# Patient Record
Sex: FEMALE | Race: WHITE | NOT HISPANIC OR LATINO
[De-identification: names, ages, dates, MRNs, and addresses within clinical notes are randomized per-mention and may not be internally consistent; named-entity substitution may affect disease eponyms.]

---

## 2018-09-25 ENCOUNTER — APPOINTMENT (OUTPATIENT)
Dept: INTERNAL MEDICINE | Facility: CLINIC | Age: 73
End: 2018-09-25
Payer: MEDICARE

## 2018-09-25 VITALS
TEMPERATURE: 97.6 F | OXYGEN SATURATION: 99 % | HEIGHT: 62.5 IN | SYSTOLIC BLOOD PRESSURE: 180 MMHG | HEART RATE: 94 BPM | DIASTOLIC BLOOD PRESSURE: 96 MMHG | BODY MASS INDEX: 21.17 KG/M2 | WEIGHT: 118 LBS

## 2018-09-25 DIAGNOSIS — Z82.3 FAMILY HISTORY OF STROKE: ICD-10-CM

## 2018-09-25 PROCEDURE — G0008: CPT

## 2018-09-25 PROCEDURE — 99214 OFFICE O/P EST MOD 30 MIN: CPT | Mod: 25

## 2018-09-25 PROCEDURE — 90662 IIV NO PRSV INCREASED AG IM: CPT

## 2018-09-25 NOTE — ASSESSMENT
[FreeTextEntry1] : Patient with chronic anxiety worse of late. I feel this patient would benefit from a trial of Lexapro 5 mg daily. Influenza vaccine 0.5 cc IM has been given. Patient is to call me in one month.

## 2018-09-25 NOTE — HISTORY OF PRESENT ILLNESS
[FreeTextEntry1] : Patient complains of increased anxiety. [de-identified] : Patient states that for the last few months she's been increasingly anxious regarding the health of her family. She's been having increasing difficulty with irritable bowel syndrome with frequent abdominal gassy pain. She sees an oncologist. She has a history of colon cancer 18 years ago. She has occasional loose bowels. She had a mammogram about one year ago. She feels she needs something for chronic anxiety.

## 2019-02-21 ENCOUNTER — MEDICATION RENEWAL (OUTPATIENT)
Age: 74
End: 2019-02-21

## 2019-03-05 ENCOUNTER — APPOINTMENT (OUTPATIENT)
Dept: INTERNAL MEDICINE | Facility: CLINIC | Age: 74
End: 2019-03-05
Payer: MEDICARE

## 2019-03-05 VITALS
HEART RATE: 89 BPM | SYSTOLIC BLOOD PRESSURE: 150 MMHG | TEMPERATURE: 97.6 F | OXYGEN SATURATION: 99 % | HEIGHT: 62.5 IN | WEIGHT: 106 LBS | DIASTOLIC BLOOD PRESSURE: 80 MMHG | BODY MASS INDEX: 19.02 KG/M2

## 2019-03-05 PROCEDURE — 99213 OFFICE O/P EST LOW 20 MIN: CPT

## 2019-03-05 NOTE — ASSESSMENT
[FreeTextEntry1] : Patient with a history of small bowel obstruction in October due to adhesions. She is clinically stable although underweight. I've advised the patient to liberalize her diet and to try to gain 10 pounds. She will see me again in 6 months.

## 2019-09-27 ENCOUNTER — APPOINTMENT (OUTPATIENT)
Dept: INTERNAL MEDICINE | Facility: CLINIC | Age: 74
End: 2019-09-27
Payer: MEDICARE

## 2019-09-27 VITALS
DIASTOLIC BLOOD PRESSURE: 80 MMHG | BODY MASS INDEX: 17.3 KG/M2 | OXYGEN SATURATION: 98 % | SYSTOLIC BLOOD PRESSURE: 140 MMHG | WEIGHT: 94 LBS | HEIGHT: 62 IN | HEART RATE: 84 BPM

## 2019-09-27 PROCEDURE — 99214 OFFICE O/P EST MOD 30 MIN: CPT | Mod: 25

## 2019-09-27 PROCEDURE — G0008: CPT

## 2019-09-27 PROCEDURE — 90653 IIV ADJUVANT VACCINE IM: CPT

## 2019-09-27 NOTE — HISTORY OF PRESENT ILLNESS
[FreeTextEntry1] : Followup underweight status [de-identified] : Patient has lost a few pounds since I last saw her 6 months ago. She's afraid T. certain foods because of her history of small bowel obstruction in October. She currently weighs 94 pounds. She feels generally well. She has occasional gassy abdominal discomfort and frequent bowels. She is on a low fiber diet. She again is afraid he because of the possibility of bowel obstruction. She has seen her oncologist in July.

## 2019-09-27 NOTE — PHYSICAL EXAM
[No Acute Distress] : no acute distress [Well Nourished] : well nourished [Well Developed] : well developed [Well-Appearing] : well-appearing [Normal Sclera/Conjunctiva] : normal sclera/conjunctiva [PERRL] : pupils equal round and reactive to light [EOMI] : extraocular movements intact [Normal Outer Ear/Nose] : the outer ears and nose were normal in appearance [Normal Oropharynx] : the oropharynx was normal [No JVD] : no jugular venous distention [No Lymphadenopathy] : no lymphadenopathy [Thyroid Normal, No Nodules] : the thyroid was normal and there were no nodules present [Supple] : supple [No Respiratory Distress] : no respiratory distress  [No Accessory Muscle Use] : no accessory muscle use [Clear to Auscultation] : lungs were clear to auscultation bilaterally [Normal Rate] : normal rate  [Normal S1, S2] : normal S1 and S2 [Regular Rhythm] : with a regular rhythm [No Murmur] : no murmur heard [No Carotid Bruits] : no carotid bruits [No Abdominal Bruit] : a ~M bruit was not heard ~T in the abdomen [No Varicosities] : no varicosities [Pedal Pulses Present] : the pedal pulses are present [No Edema] : there was no peripheral edema [No Palpable Aorta] : no palpable aorta [No Extremity Clubbing/Cyanosis] : no extremity clubbing/cyanosis [Soft] : abdomen soft [Non Tender] : non-tender [Non-distended] : non-distended [No Masses] : no abdominal mass palpated [No HSM] : no HSM [Normal Bowel Sounds] : normal bowel sounds [Normal Posterior Cervical Nodes] : no posterior cervical lymphadenopathy [Normal Anterior Cervical Nodes] : no anterior cervical lymphadenopathy [No CVA Tenderness] : no CVA  tenderness [No Spinal Tenderness] : no spinal tenderness [Grossly Normal Strength/Tone] : grossly normal strength/tone [No Joint Swelling] : no joint swelling [No Rash] : no rash [Coordination Grossly Intact] : coordination grossly intact [No Focal Deficits] : no focal deficits [Normal Gait] : normal gait [Normal Affect] : the affect was normal [Normal Insight/Judgement] : insight and judgment were intact

## 2019-09-27 NOTE — ASSESSMENT
[FreeTextEntry1] : Patient is underweight. Have had a long discussion with the patient that she should eat whatever she wants and gaining weight. I would refer to weight management discussed meal plan. High dose of flu vaccine has been given.

## 2019-10-28 ENCOUNTER — MEDICATION RENEWAL (OUTPATIENT)
Age: 74
End: 2019-10-28

## 2020-01-17 ENCOUNTER — APPOINTMENT (OUTPATIENT)
Dept: INTERNAL MEDICINE | Facility: CLINIC | Age: 75
End: 2020-01-17
Payer: MEDICARE

## 2020-01-17 ENCOUNTER — LABORATORY RESULT (OUTPATIENT)
Age: 75
End: 2020-01-17

## 2020-01-17 VITALS
HEART RATE: 90 BPM | DIASTOLIC BLOOD PRESSURE: 72 MMHG | SYSTOLIC BLOOD PRESSURE: 168 MMHG | HEIGHT: 62 IN | WEIGHT: 89 LBS | BODY MASS INDEX: 16.38 KG/M2

## 2020-01-17 PROCEDURE — 99214 OFFICE O/P EST MOD 30 MIN: CPT | Mod: 25

## 2020-01-17 PROCEDURE — 36415 COLL VENOUS BLD VENIPUNCTURE: CPT

## 2020-01-17 NOTE — ASSESSMENT
[FreeTextEntry1] : Patient clearly underweight. She currently weighs 90 pounds. She is generally somewhat weak with trouble walking long distances due to lower extremity weakness. I feel this patient should see a nutritionist regarding a diet to help her gain weight. I think she would also benefit from physical therapy for ambulation training. Patient is to call me in one month. Will also draw labs as recommended by dermatology.

## 2020-01-17 NOTE — PHYSICAL EXAM
[No Acute Distress] : no acute distress [Well Nourished] : well nourished [Well-Appearing] : well-appearing [Normal Sclera/Conjunctiva] : normal sclera/conjunctiva [PERRL] : pupils equal round and reactive to light [EOMI] : extraocular movements intact [Normal Outer Ear/Nose] : the outer ears and nose were normal in appearance [Normal Oropharynx] : the oropharynx was normal [No JVD] : no jugular venous distention [No Lymphadenopathy] : no lymphadenopathy [Supple] : supple [No Respiratory Distress] : no respiratory distress  [Thyroid Normal, No Nodules] : the thyroid was normal and there were no nodules present [No Accessory Muscle Use] : no accessory muscle use [Clear to Auscultation] : lungs were clear to auscultation bilaterally [Normal Rate] : normal rate  [Regular Rhythm] : with a regular rhythm [Normal S1, S2] : normal S1 and S2 [No Murmur] : no murmur heard [No Carotid Bruits] : no carotid bruits [No Abdominal Bruit] : a ~M bruit was not heard ~T in the abdomen [Pedal Pulses Present] : the pedal pulses are present [No Varicosities] : no varicosities [No Edema] : there was no peripheral edema [No Palpable Aorta] : no palpable aorta [No Extremity Clubbing/Cyanosis] : no extremity clubbing/cyanosis [Non Tender] : non-tender [Soft] : abdomen soft [Non-distended] : non-distended [No Masses] : no abdominal mass palpated [No HSM] : no HSM [Normal Bowel Sounds] : normal bowel sounds [Normal Anterior Cervical Nodes] : no anterior cervical lymphadenopathy [Normal Posterior Cervical Nodes] : no posterior cervical lymphadenopathy [No CVA Tenderness] : no CVA  tenderness [No Spinal Tenderness] : no spinal tenderness [Grossly Normal Strength/Tone] : grossly normal strength/tone [No Joint Swelling] : no joint swelling [No Rash] : no rash [No Focal Deficits] : no focal deficits [Coordination Grossly Intact] : coordination grossly intact [Normal Gait] : normal gait [Normal Insight/Judgement] : insight and judgment were intact [Normal Affect] : the affect was normal [de-identified] : underweight

## 2020-01-17 NOTE — HISTORY OF PRESENT ILLNESS
[FreeTextEntry1] : Evaluation for underweight status. [de-identified] : Patient was hospitalized for 3 days in the beginning of December with a small bowel obstruction. It resolves spontaneously. Patient is currently on a low fiber diet but is afraid to eat a lot. Her weight is down to 90 pounds. She feels weak with trouble walking. Her balance she states it is a bit off. She feels she has lower extremity weakness. Her upper extremities are fine. She is again afraid to use a lot of fluid because she feels she will develop a bowel obstruction. She recently saw the dermatologist because of a rash. She was told to have blood work for Sjogren's and lupus. She does have dry eyes. She has a slight dry mouth. She denies joint pains.

## 2020-01-17 NOTE — REVIEW OF SYSTEMS
[Recent Change In Weight] : ~T recent weight change [Negative] : Heme/Lymph [FreeTextEntry2] : lost 5 lbs.

## 2020-01-22 LAB
ALBUMIN SERPL ELPH-MCNC: 4.5 G/DL
ALP BLD-CCNC: 80 U/L
ALT SERPL-CCNC: 21 U/L
ANION GAP SERPL CALC-SCNC: 13 MMOL/L
AST SERPL-CCNC: 24 U/L
BASOPHILS # BLD AUTO: 0 K/UL
BASOPHILS NFR BLD AUTO: 0 %
BILIRUB SERPL-MCNC: 0.4 MG/DL
BUN SERPL-MCNC: 13 MG/DL
CALCIUM SERPL-MCNC: 9 MG/DL
CHLORIDE SERPL-SCNC: 89 MMOL/L
CO2 SERPL-SCNC: 22 MMOL/L
CREAT SERPL-MCNC: 0.54 MG/DL
ENA SS-A AB SER IA-ACNC: 0.2 AL
ENA SS-B AB SER IA-ACNC: <0.2 AL
EOSINOPHIL # BLD AUTO: 0 K/UL
EOSINOPHIL NFR BLD AUTO: 0 %
ERYTHROCYTE [SEDIMENTATION RATE] IN BLOOD BY WESTERGREN METHOD: 33 MM/HR
GLUCOSE SERPL-MCNC: 92 MG/DL
HCT VFR BLD CALC: 31.9 %
HGB BLD-MCNC: 10.9 G/DL
LYMPHOCYTES # BLD AUTO: 1.02 K/UL
LYMPHOCYTES NFR BLD AUTO: 37.6 %
MAN DIFF?: NORMAL
MCHC RBC-ENTMCNC: 32.2 PG
MCHC RBC-ENTMCNC: 34.2 GM/DL
MCV RBC AUTO: 94.4 FL
MONOCYTES # BLD AUTO: 0.3 K/UL
MONOCYTES NFR BLD AUTO: 11 %
NEUTROPHILS # BLD AUTO: 1.32 K/UL
NEUTROPHILS NFR BLD AUTO: 48.6 %
PLATELET # BLD AUTO: 268 K/UL
POTASSIUM SERPL-SCNC: 5.1 MMOL/L
PROT SERPL-MCNC: 6.7 G/DL
RBC # BLD: 3.38 M/UL
RBC # FLD: 12.1 %
SODIUM SERPL-SCNC: 124 MMOL/L
WBC # FLD AUTO: 2.72 K/UL

## 2020-01-23 ENCOUNTER — APPOINTMENT (OUTPATIENT)
Dept: BARIATRICS | Facility: CLINIC | Age: 75
End: 2020-01-23
Payer: MEDICARE

## 2020-01-23 VITALS
WEIGHT: 90 LBS | BODY MASS INDEX: 16.46 KG/M2 | DIASTOLIC BLOOD PRESSURE: 82 MMHG | HEART RATE: 88 BPM | SYSTOLIC BLOOD PRESSURE: 167 MMHG

## 2020-01-23 PROCEDURE — 99204 OFFICE O/P NEW MOD 45 MIN: CPT

## 2020-01-23 RX ORDER — PHENOBARBITAL, HYOSCYAMINE SULFATE, ATROPINE SULFATE, SCOPOLAMINE HYDROBROMIDE 16.2; .1037; .0194; .0065 MG/1; MG/1; MG/1; MG/1
16.2 TABLET ORAL
Refills: 0 | Status: DISCONTINUED | COMMUNITY
End: 2020-01-23

## 2020-01-23 NOTE — ASSESSMENT
[FreeTextEntry1] : Weight loss likely secondary to malabsorption and recent dietary restrictions\par Discussed ways to increase carbs/protein/fat sources when following low fiber/residue diet\par Will add iced cream shakes, peanut butter as tolerated\par Will refer to RD\par Screen for nutritional deficiency and hormonal conditions that may be contributing such as hyperthyroidism and adrenal insufficiency both unlikely \par Patient to start PT strengthening program\par RTO PRN

## 2020-01-23 NOTE — HISTORY OF PRESENT ILLNESS
[FreeTextEntry1] : 74 year old female for consultation due to unintentional weight loss.\par Patient is accompanied by her .  \par Patient had colon resection for colon cancer 18 years ago.  19 inches removed.  Two hospitalizations for SBO's- conservative management no requiring surgery but has lost about 5o pounds overall- 10 pounds in the past year.  Currently on a low fiber/residue diet since her last hospitalization in 2019.  \par Reports diarrhea if she has too much ensure- not sure if she is lactose intolerant.  \par History of constipation- now controlled with Senna\par +weakness, LE mostly planning to start PT in 2 weeks\par Food recall- B 2 eggs, 2 white bread toast with butter and jam S- 2 pieces of toast L- tomato soup puree (cream based) 1/2 sandwich D soup, chicken with butter S- iced cream and cookies \par

## 2020-01-23 NOTE — REASON FOR VISIT
[Initial Consultation] : an initial consultation for [FreeTextEntry2] : Weight loss, failure to thrive

## 2020-01-23 NOTE — CONSULT LETTER
[Consult Letter:] : I had the pleasure of evaluating your patient, [unfilled]. [Dear  ___] : Dear  [unfilled], [( Thank you for referring [unfilled] for consultation for _____ )] : Thank you for referring [unfilled] for consultation for [unfilled] [Please see my note below.] : Please see my note below. [Consult Closing:] : Thank you very much for allowing me to participate in the care of this patient.  If you have any questions, please do not hesitate to contact me. [Sincerely,] : Sincerely, [FreeTextEntry3] : Irene Menchaca

## 2020-01-23 NOTE — PHYSICAL EXAM
[Thin] : thin [Normal] : affect appropriate [de-identified] : small incisional hernia  [de-identified] : +rash b/l hands end of fingertips

## 2020-01-24 LAB
ANA PAT FLD IF-IMP: ABNORMAL
ANA SER IF-ACNC: ABNORMAL

## 2020-01-30 ENCOUNTER — APPOINTMENT (OUTPATIENT)
Dept: INTERNAL MEDICINE | Facility: CLINIC | Age: 75
End: 2020-01-30
Payer: MEDICARE

## 2020-01-30 PROCEDURE — 36415 COLL VENOUS BLD VENIPUNCTURE: CPT

## 2020-01-31 LAB
25(OH)D3 SERPL-MCNC: 28.5 NG/ML
ALBUMIN SERPL ELPH-MCNC: 4.3 G/DL
ALP BLD-CCNC: 84 U/L
ALT SERPL-CCNC: 23 U/L
ANION GAP SERPL CALC-SCNC: 10 MMOL/L
AST SERPL-CCNC: 23 U/L
BASOPHILS # BLD AUTO: 0.02 K/UL
BASOPHILS NFR BLD AUTO: 0.7 %
BILIRUB SERPL-MCNC: 0.3 MG/DL
BUN SERPL-MCNC: 20 MG/DL
CALCIUM SERPL-MCNC: 9.5 MG/DL
CHLORIDE SERPL-SCNC: 94 MMOL/L
CK SERPL-CCNC: 86 U/L
CO2 SERPL-SCNC: 25 MMOL/L
CORTIS SERPL-MCNC: 12.1 UG/DL
CREAT SERPL-MCNC: 0.57 MG/DL
EOSINOPHIL # BLD AUTO: 0.06 K/UL
EOSINOPHIL NFR BLD AUTO: 2.1 %
ERYTHROCYTE [SEDIMENTATION RATE] IN BLOOD BY WESTERGREN METHOD: 28 MM/HR
ESTIMATED AVERAGE GLUCOSE: 114 MG/DL
FERRITIN SERPL-MCNC: 130 NG/ML
FOLATE SERPL-MCNC: 17 NG/ML
GLUCOSE SERPL-MCNC: 94 MG/DL
HBA1C MFR BLD HPLC: 5.6 %
HCT VFR BLD CALC: 35.9 %
HGB BLD-MCNC: 11.8 G/DL
IMM GRANULOCYTES NFR BLD AUTO: 0.3 %
IRON SATN MFR SERPL: 29 %
IRON SERPL-MCNC: 88 UG/DL
LYMPHOCYTES # BLD AUTO: 0.76 K/UL
LYMPHOCYTES NFR BLD AUTO: 26.5 %
MAN DIFF?: NORMAL
MCHC RBC-ENTMCNC: 32.3 PG
MCHC RBC-ENTMCNC: 32.9 GM/DL
MCV RBC AUTO: 98.4 FL
MONOCYTES # BLD AUTO: 0.34 K/UL
MONOCYTES NFR BLD AUTO: 11.8 %
NEUTROPHILS # BLD AUTO: 1.68 K/UL
NEUTROPHILS NFR BLD AUTO: 58.6 %
PLATELET # BLD AUTO: 292 K/UL
POTASSIUM SERPL-SCNC: 4.7 MMOL/L
PROT SERPL-MCNC: 7 G/DL
RBC # BLD: 3.65 M/UL
RBC # FLD: 12.6 %
SODIUM SERPL-SCNC: 129 MMOL/L
TIBC SERPL-MCNC: 300 UG/DL
TSH SERPL-ACNC: 1.22 UIU/ML
UIBC SERPL-MCNC: 212 UG/DL
VIT B12 SERPL-MCNC: 326 PG/ML
WBC # FLD AUTO: 2.87 K/UL

## 2020-02-04 LAB — ALDOLASE SERPL-CCNC: 3.4 U/L

## 2020-02-05 LAB
VIT B1 SERPL-MCNC: 101.5 NMOL/L
VIT B6 SERPL-MCNC: 17.6 UG/L

## 2020-02-20 ENCOUNTER — APPOINTMENT (OUTPATIENT)
Dept: RHEUMATOLOGY | Facility: CLINIC | Age: 75
End: 2020-02-20
Payer: MEDICARE

## 2020-02-20 VITALS
SYSTOLIC BLOOD PRESSURE: 152 MMHG | BODY MASS INDEX: 16.56 KG/M2 | HEIGHT: 62 IN | WEIGHT: 90 LBS | DIASTOLIC BLOOD PRESSURE: 78 MMHG

## 2020-02-20 PROCEDURE — 99205 OFFICE O/P NEW HI 60 MIN: CPT

## 2020-02-20 RX ORDER — OLMESARTAN MEDOXOMIL 40 MG/1
40 TABLET, FILM COATED ORAL
Qty: 90 | Refills: 3 | Status: DISCONTINUED | COMMUNITY
Start: 2019-10-28 | End: 2020-02-20

## 2020-02-20 RX ORDER — OLMESARTAN MEDOXOMIL 40 MG/1
40 TABLET, FILM COATED ORAL DAILY
Qty: 90 | Refills: 3 | Status: DISCONTINUED | COMMUNITY
End: 2020-02-20

## 2020-02-20 RX ORDER — AMLODIPINE BESYLATE 2.5 MG/1
2.5 TABLET ORAL DAILY
Qty: 90 | Refills: 3 | Status: DISCONTINUED | COMMUNITY
End: 2020-02-20

## 2020-02-20 RX ORDER — OLMESARTAN MEDOXOMIL 40 MG/1
40 TABLET, FILM COATED ORAL
Qty: 90 | Refills: 3 | Status: DISCONTINUED | COMMUNITY
Start: 2020-01-17 | End: 2020-02-20

## 2020-02-20 RX ORDER — AMLODIPINE BESYLATE 2.5 MG/1
2.5 TABLET ORAL DAILY
Qty: 90 | Refills: 3 | Status: DISCONTINUED | COMMUNITY
Start: 2019-10-28 | End: 2020-02-20

## 2020-02-21 NOTE — REASON FOR VISIT
[Spouse] : spouse [Initial Evaluation] : an initial evaluation [FreeTextEntry1] : high BEN; rash; weight loss

## 2020-02-21 NOTE — PHYSICAL EXAM
[General Appearance - Alert] : alert [General Appearance - In No Acute Distress] : in no acute distress [General Appearance - Well Developed] : well developed [Sclera] : the sclera and conjunctiva were normal [Auscultation Breath Sounds / Voice Sounds] : lungs were clear to auscultation bilaterally [] : no respiratory distress [Edema] : there was no peripheral edema [Cervical Lymph Nodes Enlarged Posterior Bilaterally] : posterior cervical [Cervical Lymph Nodes Enlarged Anterior Bilaterally] : anterior cervical [Motor Exam] : the motor exam was normal [FreeTextEntry1] : Motor strength is 5/5 proximally. There is no objective weakness of neck extensors and flexors.

## 2020-02-21 NOTE — REVIEW OF SYSTEMS
[Constipation] : constipation [Skin Lesions] : skin lesion [Eye Pain] : no eye pain [Fever] : no fever [Chills] : no chills [Cough] : no cough [Shortness Of Breath] : no shortness of breath [Red Eyes] : eyes not red [Abdominal Pain] : no abdominal pain [SOB on Exertion] : no shortness of breath during exertion [Dysuria] : no dysuria [Diarrhea] : no diarrhea [Joint Swelling] : no joint swelling [Joint Pain] : no joint pain [Joint Stiffness] : no joint stiffness

## 2020-02-21 NOTE — HISTORY OF PRESENT ILLNESS
[FreeTextEntry1] : Patient is referred because of weight loss of about 40 lbs over about one year (no longer losing weight, but having difficulty gaining weight), and development of a rash on her hands about two months ago. Saw dermatology and was told she might have dermatomyositis and BEN was found to be high, with normal CK, and was sent for evaluation. There is no proximal weakness, nor neck flexor or extensor weakness. There is no other skin rash or skin photosensitivity, Raynaud's, fever, serositis, thrombotic events, hair thinning or other associated symptoms. There is no BECERRA or other pulmonary or cardiac symptoms. Patient has been hospitalized twice within the last year and a half with small bowel obstruction secondary to presumed surgical adhesions (s/p large bowel resection due to Ca).

## 2020-02-24 LAB
CENTROMERE IGG SER-ACNC: <0.2 CD:130001892
CK SERPL-CCNC: 119 U/L
CRP SERPL-MCNC: <0.1 MG/DL
DSDNA AB SER-ACNC: 32 IU/ML
ENA RNP AB SER IA-ACNC: 1.4 AL
ENA SCL70 IGG SER IA-ACNC: <0.2 AL
ENA SM AB SER IA-ACNC: 0.4 AL
ENA SS-A AB SER IA-ACNC: 0.2 AL
ENA SS-B AB SER IA-ACNC: <0.2 AL
ERYTHROCYTE [SEDIMENTATION RATE] IN BLOOD BY WESTERGREN METHOD: 33 MM/HR

## 2020-02-25 LAB
ALDOLASE SERPL-CCNC: 3.3 U/L
HISTONE AB SER QL: 0.5 UNITS

## 2020-02-26 LAB
ANA PAT FLD IF-IMP: ABNORMAL
ANA PATTERN: ABNORMAL
ANA SER IF-ACNC: ABNORMAL
ANA TITER: ABNORMAL

## 2020-03-11 LAB
EJ AB SER QL: NEGATIVE
ENA JO1 AB SER IA-ACNC: <20 UNITS
ENA PM/SCL AB SER-ACNC: <20 UNITS
ENA SM+RNP AB SER IA-ACNC: 69 UNITS
ENA SS-A IGG SER QL: <20 UNITS
FIBRILLARIN AB SER QL: NEGATIVE
KU AB SER QL: NEGATIVE
MDA-5 (P140)(CADM-140): <20 UNITS
MI2 AB SER QL: NEGATIVE
NXP-2 (P140): <20 UNITS
OJ AB SER QL: NEGATIVE
PL12 AB SER QL: NEGATIVE
PL7 AB SER QL: NEGATIVE
SRP AB SERPL QL: NEGATIVE
TIF GAMMA (P155/140): <20 UNITS
U2 SNRNP AB SER QL: NEGATIVE

## 2020-03-16 LAB — RNA POLYMERASE III IGG: 3 UNITS

## 2020-03-19 ENCOUNTER — APPOINTMENT (OUTPATIENT)
Dept: RHEUMATOLOGY | Facility: CLINIC | Age: 75
End: 2020-03-19

## 2020-04-10 ENCOUNTER — APPOINTMENT (OUTPATIENT)
Dept: RHEUMATOLOGY | Facility: CLINIC | Age: 75
End: 2020-04-10
Payer: MEDICARE

## 2020-04-10 PROCEDURE — 99443: CPT

## 2020-04-17 ENCOUNTER — APPOINTMENT (OUTPATIENT)
Dept: INTERNAL MEDICINE | Facility: CLINIC | Age: 75
End: 2020-04-17
Payer: MEDICARE

## 2020-04-17 PROCEDURE — G2012 BRIEF CHECK IN BY MD/QHP: CPT

## 2020-05-19 ENCOUNTER — LABORATORY RESULT (OUTPATIENT)
Age: 75
End: 2020-05-19

## 2020-05-19 ENCOUNTER — APPOINTMENT (OUTPATIENT)
Dept: INTERNAL MEDICINE | Facility: CLINIC | Age: 75
End: 2020-05-19
Payer: MEDICARE

## 2020-05-19 VITALS
HEIGHT: 62 IN | HEART RATE: 94 BPM | DIASTOLIC BLOOD PRESSURE: 70 MMHG | BODY MASS INDEX: 16.93 KG/M2 | SYSTOLIC BLOOD PRESSURE: 130 MMHG | WEIGHT: 92 LBS | OXYGEN SATURATION: 99 %

## 2020-05-19 PROCEDURE — 99214 OFFICE O/P EST MOD 30 MIN: CPT | Mod: 25

## 2020-05-19 PROCEDURE — 36415 COLL VENOUS BLD VENIPUNCTURE: CPT

## 2020-05-19 NOTE — PLAN
[FreeTextEntry1] : 4 CT of the abdomen and pelvis with and without contrast. We'll obtain routine labs. Patient is to call on Thursday for results in

## 2020-05-19 NOTE — ASSESSMENT
[FreeTextEntry1] : I am concerned that the patient has not gained more weight as I expected. We'll check routine labs. Will order a CT of the abdomen and pelvis with and without contrast for evaluation of weight loss. If there is nothing on the CAT scan of significance we'll refer her to GI.

## 2020-05-19 NOTE — PHYSICAL EXAM
[No Acute Distress] : no acute distress [Well Nourished] : well nourished [Well-Appearing] : well-appearing [EOMI] : extraocular movements intact [PERRL] : pupils equal round and reactive to light [Normal Sclera/Conjunctiva] : normal sclera/conjunctiva [Normal Oropharynx] : the oropharynx was normal [Normal Outer Ear/Nose] : the outer ears and nose were normal in appearance [Supple] : supple [No JVD] : no jugular venous distention [No Lymphadenopathy] : no lymphadenopathy [Thyroid Normal, No Nodules] : the thyroid was normal and there were no nodules present [No Respiratory Distress] : no respiratory distress  [Clear to Auscultation] : lungs were clear to auscultation bilaterally [Normal Rate] : normal rate  [No Accessory Muscle Use] : no accessory muscle use [No Murmur] : no murmur heard [Regular Rhythm] : with a regular rhythm [Normal S1, S2] : normal S1 and S2 [No Abdominal Bruit] : a ~M bruit was not heard ~T in the abdomen [No Carotid Bruits] : no carotid bruits [No Varicosities] : no varicosities [No Edema] : there was no peripheral edema [Pedal Pulses Present] : the pedal pulses are present [No Palpable Aorta] : no palpable aorta [Soft] : abdomen soft [No Extremity Clubbing/Cyanosis] : no extremity clubbing/cyanosis [Non-distended] : non-distended [Non Tender] : non-tender [No Masses] : no abdominal mass palpated [No HSM] : no HSM [Normal Bowel Sounds] : normal bowel sounds [Normal Anterior Cervical Nodes] : no anterior cervical lymphadenopathy [Normal Posterior Cervical Nodes] : no posterior cervical lymphadenopathy [No Spinal Tenderness] : no spinal tenderness [No Joint Swelling] : no joint swelling [No CVA Tenderness] : no CVA  tenderness [Grossly Normal Strength/Tone] : grossly normal strength/tone [Coordination Grossly Intact] : coordination grossly intact [No Rash] : no rash [Normal Gait] : normal gait [No Focal Deficits] : no focal deficits [Normal Insight/Judgement] : insight and judgment were intact [Normal Affect] : the affect was normal [de-identified] : underweight

## 2020-05-19 NOTE — HISTORY OF PRESENT ILLNESS
[FreeTextEntry1] : Followup underweight status. [de-identified] : Patient has gained only 2 pounds since I last saw her in January. She remained so for a LASHON concern with fluids because of the fear that she is now going to develop a bowel obstruction. She has occasional upper gastric distress. She states that she always feels hungry. She eats mainly chicken and fish. She denies nausea, vomiting. She is chronically constipated but better with the use of Senokot. Her weight is currently 92 pounds. She goes for physical therapy to improve her overall strength and ability to walk.

## 2020-05-21 LAB
ALBUMIN SERPL ELPH-MCNC: 4.5 G/DL
ALP BLD-CCNC: 87 U/L
ALT SERPL-CCNC: 32 U/L
ANION GAP SERPL CALC-SCNC: 12 MMOL/L
AST SERPL-CCNC: 29 U/L
BASOPHILS # BLD AUTO: 0 K/UL
BASOPHILS NFR BLD AUTO: 0 %
BILIRUB SERPL-MCNC: 0.3 MG/DL
BUN SERPL-MCNC: 23 MG/DL
CALCIUM SERPL-MCNC: 9.6 MG/DL
CHLORIDE SERPL-SCNC: 91 MMOL/L
CO2 SERPL-SCNC: 24 MMOL/L
CREAT SERPL-MCNC: 0.53 MG/DL
EOSINOPHIL # BLD AUTO: 0 K/UL
EOSINOPHIL NFR BLD AUTO: 0 %
GLUCOSE SERPL-MCNC: 93 MG/DL
HCT VFR BLD CALC: 36 %
HGB BLD-MCNC: 11.8 G/DL
LYMPHOCYTES # BLD AUTO: 1.07 K/UL
LYMPHOCYTES NFR BLD AUTO: 41.1 %
MAN DIFF?: NORMAL
MCHC RBC-ENTMCNC: 31.5 PG
MCHC RBC-ENTMCNC: 32.8 GM/DL
MCV RBC AUTO: 96 FL
MONOCYTES # BLD AUTO: 0.14 K/UL
MONOCYTES NFR BLD AUTO: 5.3 %
NEUTROPHILS # BLD AUTO: 1.4 K/UL
NEUTROPHILS NFR BLD AUTO: 53.6 %
PLATELET # BLD AUTO: 293 K/UL
POTASSIUM SERPL-SCNC: 4.8 MMOL/L
PROT SERPL-MCNC: 7.1 G/DL
RBC # BLD: 3.75 M/UL
RBC # FLD: 12.2 %
SODIUM SERPL-SCNC: 127 MMOL/L
WBC # FLD AUTO: 2.61 K/UL

## 2020-06-16 ENCOUNTER — RESULT REVIEW (OUTPATIENT)
Age: 75
End: 2020-06-16

## 2020-06-30 ENCOUNTER — APPOINTMENT (OUTPATIENT)
Dept: GASTROENTEROLOGY | Facility: CLINIC | Age: 75
End: 2020-06-30
Payer: MEDICARE

## 2020-06-30 VITALS
SYSTOLIC BLOOD PRESSURE: 138 MMHG | HEART RATE: 88 BPM | WEIGHT: 93 LBS | HEIGHT: 62 IN | DIASTOLIC BLOOD PRESSURE: 70 MMHG | BODY MASS INDEX: 17.11 KG/M2

## 2020-06-30 PROCEDURE — 99204 OFFICE O/P NEW MOD 45 MIN: CPT

## 2020-06-30 NOTE — PHYSICAL EXAM
[Abdomen Hernia Ventral] : a ventral hernia was present [Abdomen Hernia Umbilical] : an umbilical hernia was present [] : which was reducible

## 2020-06-30 NOTE — ASSESSMENT
[FreeTextEntry1] : Abdominal Pain\par assoc w constipation/bloat\par dyspepsia \par wt loss\par mild anemia\par PE w reducible incisional hernia\par \par --s/p resection 7/200\par s/p sbo x 2\par CT Abd/Pelv w po C: diverticulosis--6/2020\par 12/4/18 SBFT: wnl\par \par Probably w low grade partial obstructive physiology\par P: diet is LR, lactose free, low fat\par anti-reflux diet\par adequate fluid \par reg exercise\par omep & Pepcid complete \par Senakot 2 qhs + MOM 30cc q am\par Abdominal Binder\par surgical consult for hernia repair at tertiary center\par \par DDX: SIBO w MCTD, s/p RHC\par          Malabsorption\par          Colonic Neoplasia\par          IBD                                                                                                                                                                                   Functional Bowel D/O.   \par          GERD/Gastritis                                                                                                          \par \par \par      \par P: check celiac/IBD panel, stool for fecal fat/calprotectin, CEA, Ca 19-9\par     Senakot 2 qhs\par     MOM 15-30cc q afternoon \par     Omep 20 q am & Pepcid pre dinner \par     Abd binder \par \par    EGD\par   Colonoscopy\par   ? neuromodulator/ Creon / rotating ABX\par \par \par \par \par \par \par 2. Diverticulosis:  well – controlled.  No pain,  infection,  bleeding\par * Discussed the potential complications of perforation,  pain,  infection,  bleeding \par * Low – Fiber Diet was reviewed and emphasized\par * 6  --  8 cups of decaffeinated fluid daily\par \par \par \par \par \par \par 3. Hemorrhoids:  well – controlled.  No pain,  swelling,  itch,  bleeding\par * Discussed the potential complications of thrombosis,  pain,  infection,  swelling, itching,  bleeding \par * Low  – Fiber Diet was reviewed and emphasized\par * 6  --  8 cups of decaffeinated fluid daily\par * Sitz Bathes BID,  No:  Anusol HC  Suppos / Cream  [place] BID\par * No:  Tucks BID,   No:  Balneol Lotion,   No:  Calmoseptine Oint,   ++ Prep H prn\par * No:  Colorectal surgical evaluation for possible ablation w \par \par \par \par \par \par \par Informed Consent:\par * The risks & Benefits of EGD  /  Colonoscopy were discussed w patient.\par * This included but was not limited to perforation, bleeding, sedation /med rxns possibly requiring surgery, blood transfusions, antibiotics & CPR/Intubation.\par * Pt. understands & agrees to the procedures.\par * Pt. advised to D/C  ASA/NSAIDs  7  Days \par * [ +++ ]  Dulcolax / Miralax / Mag. Citrate ,  [     ] Prepopik/ Clenpiq ,  [     ] Osmo Prep,  [    ] GoLytely,  prep. reviewed w Pt.\par * Hold  [           ] AM of procedure.\par * Hold  [           ] PM of procedure.\par * Take  [           ] PM of procedure.\par * Take  [           ] AM of procedure.\par \par

## 2020-06-30 NOTE — HISTORY OF PRESENT ILLNESS
[de-identified] : \par PCP: Josh\par \par 73 yo F h/o Anxiety, HTN, HLD, Anemia/ neutropenia\par h/o Colon Ca ~ 3.5cm from ICV: s/p R colectomy 7/2000 + 5FU and Leucovorin\par had routine f/u imaging and colonoscopy\par 10/26/18-11/5/18 s/p sbo p/w abd pain, N, Bilious vomiting\par CT w po/IVC: transition pt w/i midline lower abdomen/superior pelvisw adjacent distended non-contrast fluid-filled sb loops extending into pelvis and demonstrating mucosal enhancement w surrounding ascites and mesenteric edema. small midline ventral abdominal  hernia into which a sht segment of mid-TV colon protrudes thru. \par 11/30-- 12/2/19 s/p Adm for sbo; p/w abd pain, N/V, diarrhea; CT w po/IVC: sbo w transition pt w/i pelvis\par 3/5/19: oh=406, usu 120\par 9/27/19: wt=94, prn donnatal for cramping\par 1/17/20: wt=90\par 2/20/20 Dr. foto: for wt loss, rash 2 mo prior on hands, had seen derm told of +BEN and ? 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 callejas: epigastric burn, Inc Gas on Pepcid 20mg--> omep 20mg qd\par 5/19/20: Dr. Callejas: occas upper GI distress, always feels hungry, + constipation on Senakot\par Labs: WBC=2.6, Hgb=11.8 , BUN=23, creat=0.5\par \par 6/17/20 CT ABD/PEL w po C: neg\par \par Today: long h/o constipation x years, worsened after SBO's--> went to [facility], also lost a lot after wt each hospitalization, never able to gain it back. wt=93\par over last 1-2 mo c/o epigastric/hypogastric pain: ache--> Left upper quad and flank\par relieved by passing gas\par BMs: #4 q am, then feels like she has to go, but nothing, occas loose/mushy BM later\par + Bloat, no melena, brbpr\par + epigastric burn/ht burn, + belch\par No anorexia, or early satiety\par \par * Abd pain—yes\par * Nausea—no\par * Vomit—no\par * Belching—yes\par * Regurgitation—no\par * Ht burn—yes\par * Throat Clearing—no\par * Globus—no\par * Cough—no\par * Hoarseness—no\par * Dysphagia—no\par * BMs: # 4 q am, then feels like she has to go, but nothing, occas loose/mushy BM later\par * Constipation—yes\par * Diarrhea—no\par * Bloating—yes\par * Flatulence—no\par * Gurgling—no\par * Melena—no\par * BPBPR—no\par * Anorexia—no\par * Wt. Loss-yes\par \par \par

## 2020-07-21 ENCOUNTER — LABORATORY RESULT (OUTPATIENT)
Age: 75
End: 2020-07-21

## 2020-07-21 ENCOUNTER — APPOINTMENT (OUTPATIENT)
Dept: GASTROENTEROLOGY | Facility: CLINIC | Age: 75
End: 2020-07-21
Payer: MEDICARE

## 2020-07-21 VITALS
SYSTOLIC BLOOD PRESSURE: 138 MMHG | BODY MASS INDEX: 17.3 KG/M2 | WEIGHT: 94 LBS | DIASTOLIC BLOOD PRESSURE: 72 MMHG | HEART RATE: 84 BPM | HEIGHT: 62 IN

## 2020-07-21 PROCEDURE — 99214 OFFICE O/P EST MOD 30 MIN: CPT

## 2020-07-21 NOTE — HISTORY OF PRESENT ILLNESS
[de-identified] : \par PCP: Josh\par \par 75 yo F h/o Anxiety, HTN, HLD, Anemia/ neutropenia\par h/o Colon Ca ~ 3.5cm from ICV: s/p R colectomy 7/2000 + 5FU and Leucovorin\par had routine f/u imaging and colonoscopy\par 10/26/18-11/5/18 s/p sbo p/w abd pain, N, Bilious vomiting\par CT w po/IVC: transition pt w/i midline lower abdomen/superior pelvis w adjacent distended non-contrast fluid-filled sb loops extending into pelvis and demonstrating mucosal enhancement w surrounding ascites and mesenteric edema. small midline ventral abdominal  hernia into which a sht segment of mid-TV colon protrudes thru. \par 11/30-- 12/2/19 s/p Adm for sbo; p/w abd pain, N/V, diarrhea; CT w po/IVC: sbo w transition pt w/i pelvis\par 3/5/19: rf=304, usu 120\par 9/27/19: wt=94, prn donnatal for cramping\par 1/17/20: wt=90\par 2/20/20 Dr. foto: for wt loss, rash 2 mo prior on hands, had seen derm told of +BEN and ? 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 callejas: epigastric burn, Inc Gas on Pepcid 20mg--> omep 20mg qd\par 5/19/20: Dr. Callejas: occas upper GI distress, always feels hungry, + constipation on Senakot\par Labs: WBC=2.6, Hgb=11.8 , BUN=23, creat=0.5\par \par 6/17/20 CT ABD/PEL w po C: neg\par \par Init CC 6/30/20:  long h/o constipation x years, worsened after SBO's--> went to [facility], also lost a lot wt after each hospitalization, never able to gain it back. wt=93\par over last 1-2 mo c/o epigastric/hypogastric pain: ache--> Left upper quad and flank\par relieved by passing gas\par BMs: #4 q am, then feels like she has to go, but nothing, occas loose/mushy BM later\par + Bloat, no melena, brbpr\par + epigastric burn/ht burn, + belch\par No anorexia, or early satiety\par \par \par Today: no real change, MOM 30cc--> Incr cramping and stool frequency, gas in Left colon which\par released w BM\par Abd binder felt restrictive--too much \par Wt=94\par * Abd pain—epigastric: less but an emptiness which decr w eating ~ 11am/4 pm \par Hypogastric--some \par LUQ/Flank--yes\par LLQ--yes\par * Nausea—no\par * Vomit—no\par * Belching—no\par * Regurgitation—no\par * Ht burn—no\par * Throat Clearing—no\par * Globus—no\par * Cough—no\par * Hoarseness—no\par * Dysphagia—no\par * BMs: # 4 q am, then feels like she has to go, but nothing, occas loose/mushy BM later\par * Constipation—ssome\par * Diarrhea—no\par * Bloating—yes\par * Flatulence—no\par * Gurgling—no\par * Melena—no\par * BPBPR—no\par * Anorexia—no\par * Wt. Loss-no\par \par \par

## 2020-07-21 NOTE — ASSESSMENT
[FreeTextEntry1] : Abdominal Pain: epigastric\par                           L-sided\par assoc w constipation/bloat\par dyspepsia \par wt loss\par mild anemia\par PE w reducible incisional hernia\par \par --s/p resection 7/2000\par s/p sbo x 2\par CT Abd/Pelv w po C: diverticulosis--6/2020\par 12/4/18 SBFT: wnl\par \par May have some  low grade partial obstructive physiology\par \par DDX: SIBO w MCTD, s/p RHC\par          Malabsorption\par          Colonic Neoplasia\par          IBD                                                                                                                                                                                   Functional Bowel D/O.   \par          GERD/Gastritis                                                                                                          \par \par      \par \par P: diet is LR, lactose free, low fat\par will need to transition diet to have provide some bulking  agent: Benefiber 2 tsp w breakfast first\par anti-reflux diet\par adequate fluid \par reg exercise\par \par check celiac/IBD panel, stool for fecal fat/calprotectin, CEA, Ca 19-9\par     Senakot 2 qhs\par     MOM 15-30cc q afternoon --held 2/2 cramping/inc stool freq\par     Omep 20 q am & Pepcid pre dinner \par     Florastor 1 qd \par     Abd binder --slowly increase usage to decondition\par \par    EGD\par   Colonoscopy\par   ? neuromodulator: / Buspar 5 bid/Remeron 7.5 q hs/ FDgard/ Creon / rotating ABX\par \par surgical consult for hernia repair at tertiary center\par \par \par \par 2. Diverticulosis:  well – controlled.  No pain,  infection,  bleeding\par * Discussed the potential complications of perforation,  pain,  infection,  bleeding \par * Low – Fiber Diet was reviewed and emphasized\par * 6  --  8 cups of decaffeinated fluid daily\par \par \par \par \par \par \par 3. Hemorrhoids:  well – controlled.  No pain,  swelling,  itch,  bleeding\par * Discussed the potential complications of thrombosis,  pain,  infection,  swelling, itching,  bleeding \par * Low  – Fiber Diet was reviewed and emphasized\par * 6  --  8 cups of decaffeinated fluid daily\par * Sitz Bathes BID,  No:  Anusol HC  Suppos / Cream  [place] BID\par * No:  Tucks BID,   No:  Balneol Lotion,   No:  Calmoseptine Oint,   ++ Prep H prn\par * No:  Colorectal surgical evaluation for possible ablation w Dr\par \par \par \par \par \par \par Informed Consent:\par * The risks & Benefits of EGD  /  Colonoscopy were discussed w patient.\par * This included but was not limited to perforation, bleeding, sedation /med rxns possibly requiring surgery, blood transfusions, antibiotics & CPR/Intubation.\par * Pt. understands & agrees to the procedures.\par * Pt. advised to D/C  ASA/NSAIDs  7  Days \par * [ +++ ]  Dulcolax / Miralax / Mag. Citrate ,  [     ] Prepopik/ Clenpiq ,  [     ] Osmo Prep,  [    ] GoLytely,  prep. reviewed w Pt.\par * Hold  [           ] AM of procedure.\par * Hold  [           ] PM of procedure.\par * Take  [           ] PM of procedure.\par * Take  [           ] AM of procedure.\par \par

## 2020-07-22 LAB
BAKER'S YEAST AB QL: 12 UNITS
BAKER'S YEAST IGA QL IA: 9.7 UNITS
BAKER'S YEAST IGA QN IA: NEGATIVE
BAKER'S YEAST IGG QN IA: NEGATIVE
CANCER AG19-9 SERPL-ACNC: 8 U/ML
CEA SERPL-MCNC: 2.9 NG/ML
CRP SERPL-MCNC: <0.1 MG/DL
GLIADIN IGA SER QL: <5 UNITS
GLIADIN IGG SER QL: <5 UNITS
GLIADIN PEPTIDE IGA SER-ACNC: NEGATIVE
GLIADIN PEPTIDE IGG SER-ACNC: NEGATIVE
MPO AB + PR3 PNL SER: NORMAL
TTG IGA SER IA-ACNC: <1.2 U/ML
TTG IGA SER-ACNC: NEGATIVE
TTG IGG SER IA-ACNC: 1.3 U/ML
TTG IGG SER IA-ACNC: NEGATIVE

## 2020-07-25 LAB
BARLEY IGE QN: <0.1 KUA/L
CHERRY IGE QN: <0.1 KUA/L
COW MILK IGE QN: <0.1 KUA/L
CRAB IGE QN: <0.1 KUA/L
DEPRECATED BARLEY IGE RAST QL: 0
DEPRECATED CHERRY IGE RAST QL: 0
DEPRECATED COW MILK IGE RAST QL: 0
DEPRECATED CRAB IGE RAST QL: 0
DEPRECATED EGG WHITE IGE RAST QL: NORMAL
DEPRECATED OAT IGE RAST QL: NORMAL
DEPRECATED PEANUT IGE RAST QL: 0
DEPRECATED RYE IGE RAST QL: 0
DEPRECATED SOYBEAN IGE RAST QL: NORMAL
DEPRECATED WHEAT IGE RAST QL: 0
EGG WHITE IGE QN: 0.1 KUA/L
OAT IGE QN: 0.1 KUA/L
PEANUT IGE QN: <0.1 KUA/L
RYE IGE QN: <0.1 KUA/L
SOYBEAN IGE QN: 0.18 KUA/L
TOTAL IGE SMQN RAST: 98 KU/L
WHEAT IGE QN: <0.1 KUA/L

## 2020-07-29 ENCOUNTER — APPOINTMENT (OUTPATIENT)
Dept: RHEUMATOLOGY | Facility: CLINIC | Age: 75
End: 2020-07-29
Payer: MEDICARE

## 2020-07-29 ENCOUNTER — RESULT REVIEW (OUTPATIENT)
Age: 75
End: 2020-07-29

## 2020-07-29 VITALS
DIASTOLIC BLOOD PRESSURE: 76 MMHG | SYSTOLIC BLOOD PRESSURE: 140 MMHG | WEIGHT: 94 LBS | BODY MASS INDEX: 17.3 KG/M2 | HEIGHT: 62 IN

## 2020-07-29 PROCEDURE — 99215 OFFICE O/P EST HI 40 MIN: CPT

## 2020-07-29 NOTE — PHYSICAL EXAM
[General Appearance - In No Acute Distress] : in no acute distress [General Appearance - Alert] : alert [General Appearance - Well Developed] : well developed [FreeTextEntry1] : There are erythematous plaques over the distal phalanges of several fingers. There are no classic Gottron's papules.

## 2020-07-29 NOTE — HISTORY OF PRESENT ILLNESS
[FreeTextEntry1] : Patient reports she has gained a few lb since last visit. also, hand rash has not improved, and there has been no increase in joint pain since last visit. About two days ago she notes sudden onset of low back pain (points to low to mid lumbar spine) without radiation of pain. She reports having fallen onto her buttocks about two weeks ago but pain started about 2 days ago. No other progression of symptoms. She has been having increased constipation recently.

## 2020-07-29 NOTE — REVIEW OF SYSTEMS
[Constipation] : constipation [Skin Lesions] : skin lesion [Fever] : no fever [Chills] : no chills [Red Eyes] : eyes not red [Eye Pain] : no eye pain [Shortness Of Breath] : no shortness of breath [Cough] : no cough [SOB on Exertion] : no shortness of breath during exertion [Diarrhea] : no diarrhea [Abdominal Pain] : no abdominal pain [Joint Pain] : no joint pain [Dysuria] : no dysuria [Joint Swelling] : no joint swelling [Joint Stiffness] : no joint stiffness

## 2020-08-05 ENCOUNTER — APPOINTMENT (OUTPATIENT)
Dept: PAIN MANAGEMENT | Facility: HOSPITAL | Age: 75
End: 2020-08-05
Payer: MEDICARE

## 2020-08-05 VITALS
BODY MASS INDEX: 16.93 KG/M2 | WEIGHT: 92 LBS | TEMPERATURE: 97 F | HEIGHT: 62 IN | DIASTOLIC BLOOD PRESSURE: 62 MMHG | SYSTOLIC BLOOD PRESSURE: 110 MMHG

## 2020-08-05 PROCEDURE — 99205 OFFICE O/P NEW HI 60 MIN: CPT

## 2020-08-05 NOTE — CONSULT LETTER
[Dear  ___] : Dear  [unfilled], [Consult Letter:] : I had the pleasure of evaluating your patient, [unfilled]. [Please see my note below.] : Please see my note below. [Sincerely,] : Sincerely, [Consult Closing:] : Thank you very much for allowing me to participate in the care of this patient.  If you have any questions, please do not hesitate to contact me. [FreeTextEntry3] : Linwood Lizarraga MD\par

## 2020-08-05 NOTE — ASSESSMENT
[FreeTextEntry1] : 74 yof presents w/ severe low back pain which began one week after falling down steps. Pain is axial and non-radiating. Using NSAIDS and acetaminophen w/ minimal relief. Pain is worsening. I have personally reviewed her X-ray which does reveal an acute L1 VCF. Recommend MRI lumbar spine for further evaluation. Start tizanidine prn for spasm. Discussed kyphoplasty in detail w/ patient, she will consider. Pt to return to review MRI.

## 2020-08-05 NOTE — PHYSICAL EXAM
[de-identified] : Constitutional: Well-developed, in no acute distress\par Eyes: Pupil- Right: normal, Left: normal\par Neck exam: Inspection normal\par Respiratory: Normal effort, speaking in full sentences\par Cardiovascular: Regular rate and rhythm\par Vascular: Dorsal pedis pulses normal and equal bilaterally\par Abdomen: Inspection normal, no distension\par Skin: Inspection normal, no bruising noted\par Musculoskeletal:\par Lumbar Spine:   Gait: Antalgic\par 		Inspection: Normal curvature, no abnormal kyphosis or scoliosis\par 		Facet loading: pain bilaterally\par 		Palpation:\par 			Lumbar and paraspinal muscles: pain bilaterally\par 			Sacroiliac joint: no pain\par 			Greater trochanter: no pain\par 		Muscle Strength:\par 		Iliopsoas: 5/5 bilaterally\par 		Quadriceps: 5/5 bilaterally\par 		Hamstrings: 5/5 bilaterally\par 		Tibialis anterior: 5/5 bilaterally\par 		Extensor hallucis longus: 5/5 bilaterally\par \par 		Sensation: normal and equal in bilateral lower extremities\par \par 		Reflexes:\par 			Patellar reflex: 2+ bilaterally\par 			Ankle jerk reflex: 2+ bilaterally\par 		\par 		Straight leg raise: negative bilaterally\par \par Extremity: no edema noted\par Neurological: Memory normal, AAO x 3, Cranial nerves II - XII grossly normal\par Psychiatric: Appropriate mood and affect, oriented to time, place, person, and situation\par \par \par

## 2020-08-05 NOTE — DATA REVIEWED
[FreeTextEntry1] :  07/29/20\par XC L S SPINE 2 OR 3 VIEWS\par \par \par There is an acute/subacute compression deformity of the L1 vertebral body resulting in\par approximately 30 % loss of vertebral body height. There is minimal retrolisthesis of L2 on L3, L3 on\par L4 and L4 on L5 as well as grade 1 anterolisthesis of L5 on S1. There is multilevel discogenic\par degenerative disease of the lumbar spine as manifested by disc space narrowing and endplate\par osteophytosis, most pronounced at L3-L4 and L2-L3.\par \par \par  Impression:\par \par  Acute/subacute L1 compression deformity.\par \par  Discogenic degenerative disease, most pronounced at L2-L3 and L3-L4.\par

## 2020-08-05 NOTE — HISTORY OF PRESENT ILLNESS
[___ wks] : [unfilled] week(s) ago [Constant] : constant [7] : a maximum pain level of 7/10 [Sitting] : sitting [Bending] : bending [Rest] : rest [FreeTextEntry1] : 74 yof presents w/ severe low back pain which began one week after falling down steps. Pain is axial and non-radiating. Using NSAIDS and acetaminophen w/ minimal relief. Pain is worsening. No relief w/ HEP.  [FreeTextEntry7] : Lower back  pain  [de-identified] : numbness, pins/needles

## 2020-08-12 ENCOUNTER — RESULT REVIEW (OUTPATIENT)
Age: 75
End: 2020-08-12

## 2020-08-13 ENCOUNTER — APPOINTMENT (OUTPATIENT)
Dept: GASTROENTEROLOGY | Facility: CLINIC | Age: 75
End: 2020-08-13
Payer: MEDICARE

## 2020-08-13 VITALS
DIASTOLIC BLOOD PRESSURE: 68 MMHG | SYSTOLIC BLOOD PRESSURE: 120 MMHG | HEART RATE: 80 BPM | BODY MASS INDEX: 16.56 KG/M2 | HEIGHT: 62 IN | WEIGHT: 90 LBS

## 2020-08-13 PROCEDURE — 99214 OFFICE O/P EST MOD 30 MIN: CPT

## 2020-08-13 NOTE — ASSESSMENT
[FreeTextEntry1] : Abdominal Pain: epigastric\par                           L-sided\par assoc w constipation/bloat\par dyspepsia \par wt loss--presently avoidance 2/2 constipation/ Dulox--decr appetite\par mild anemia\par PE w reducible incisional hernia\par \par --s/p R. colon resection 7/2000 for Colon Ca\par s/p sbo x 2\par CT Abd/Pelv w po C: diverticulosis--6/2020\par 12/4/18 SBFT: wnl\par \par Perhaps some  low grade partial obstructive physiology w hernia/adhesive dis \par \par DDX: SIBO w MCTD, s/p RHC\par          Malabsorption\par          Colonic Neoplasia\par          IBD --unlikely w normal CRP/IBD panel                                                                                                                            Functional Bowel D/O--more likely   \par          GERD/Gastritis                                                                                                          \par \par      \par \par P: diet is LR, lactose free, low fat\par will need to transition diet to have provide some bulking  agent: Benefiber 2 tsp w breakfast first\par anti-reflux diet\par adequate fluid \par reg exercise\par \par check stool for fecal fat/calprotectin \par     Senakot 2 qhs--transition off \par     MOM 15-30cc q afternoon --held 2/2 cramping/inc stool freq\par     Miralax 1 cap--> incr  1 1/2 cap \par     Benefiber Gummies 2 q am \par     D/C Cymbalta 20--contipating \par     Omep 20 q am & Pepcid pre dinner \par     Florastor 1 qd \par     Abd binder --slowly increase usage to decondition\par \par    EGD\par   Colonoscopy\par   ? neuromodulator: / Buspar 5 bid/Remeron 7.5 q hs/ FDgard/ Creon / rotating ABX\par \par surgical consult for hernia repair at tertiary center\par \par \par \par 2. Diverticulosis:  well – controlled.  No pain,  infection,  bleeding\par * Discussed the potential complications of perforation,  pain,  infection,  bleeding \par * Low – Fiber Diet was reviewed and emphasized\par * 6  --  8 cups of decaffeinated fluid daily\par \par \par \par \par \par \par 3. Hemorrhoids:  well – controlled.  No pain,  swelling,  itch,  bleeding\par * Discussed the potential complications of thrombosis,  pain,  infection,  swelling, itching,  bleeding \par * Low  – Fiber Diet was reviewed and emphasized\par * 6  --  8 cups of decaffeinated fluid daily\par * Sitz Bathes BID,  No:  Anusol HC  Suppos / Cream  [place] BID\par * No:  Tucks BID,   No:  Balneol Lotion,   No:  Calmoseptine Oint,   ++ Prep H prn\par * No:  Colorectal surgical evaluation for possible ablation \par \par \par \par \par \par \par Informed Consent:\par * The risks & Benefits of EGD  /  Colonoscopy were discussed w patient.\par * This included but was not limited to perforation, bleeding, sedation /med rxns possibly requiring surgery, blood transfusions, antibiotics & CPR/Intubation.\par * Pt. understands & agrees to the procedures.\par * Pt. advised to D/C  ASA/NSAIDs  7  Days \par * [ +++ ]  Dulcolax / Miralax / Mag. Citrate ,  [     ] Prepopik/ Clenpiq ,  [     ] Osmo Prep,  [    ] GoLytely,  prep. reviewed w Pt.\par * Hold  [           ] AM of procedure.\par * Hold  [           ] PM of procedure.\par * Take  [           ] PM of procedure.\par * Take  [           ] AM of procedure.\par \par

## 2020-08-13 NOTE — HISTORY OF PRESENT ILLNESS
[de-identified] : \par PCP: Josh\par \par 73 yo F h/o Anxiety, HTN, HLD, Anemia/ neutropenia\par h/o Colon Ca ~ 3.5cm from ICV: s/p R colectomy 7/2000 + 5FU and Leucovorin\par had routine f/u imaging and colonoscopy\par 10/26/18-11/5/18 s/p sbo p/w abd pain, N, Bilious vomiting\par CT w po/IVC: transition pt w/i midline lower abdomen/superior pelvis w adjacent distended non-contrast fluid-filled sb loops extending into pelvis and demonstrating mucosal enhancement w surrounding ascites and mesenteric edema. small midline ventral abdominal  hernia into which a sht segment of mid-TV colon protrudes thru. \par 11/30-- 12/2/19 s/p Adm for sbo; p/w abd pain, N/V, diarrhea; CT w po/IVC: sbo w transition pt w/i pelvis\par 3/5/19: pk=425, usu 120\par 9/27/19: wt=94, prn donnatal for cramping\par 1/17/20: wt=90\par 2/20/20 Dr. foto: for wt loss, rash 2 mo prior on hands, had seen derm told of +BEN and ? 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 callejas: epigastric burn, Inc Gas on Pepcid 20mg--> omep 20mg qd\par 5/19/20: Dr. Callejas: occas upper GI distress, always feels hungry, + constipation on Senakot\par Labs: WBC=2.6, Hgb=11.8 , BUN=23, creat=0.5\par \par 6/17/20 CT ABD/PEL w po C: neg\par \par Init CC 6/30/20:  long h/o constipation x years, worsened after SBO's--> went to [facility], also lost a lot wt after each hospitalization, never able to gain it back. wt=93\par over last 1-2 mo c/o epigastric/hypogastric pain: ache--> Left upper quad and flank\par relieved by passing gas\par BMs: #4 q am, then feels like she has to go, but nothing, occas loose/mushy BM later\par + Bloat, no melena, brbpr\par + epigastric burn/ht burn, + belch\par No anorexia, or early satiety\par 7/21/20: no real change, MOM 30cc--> Incr cramping and stool frequency, gas in Left colon which\par released w BMs: # 4 q am, then feels like she has to go, but nothing, occas loose/mushy BM later\par Abd binder felt restrictive--too much , + Bloat \par Wt=94, epigastric: less but an emptiness which decr w eating ~ 11am/4 pm \par Labs:ANCA/ASCA, Celiac/Food Allergy panels--neg, Ca19-9, CEA-wnl, CRP <0.1\par \par 7/29/20: : c/o acute LBP:s/pfall on buttocks ~ 2 wks ago\par Rx w Advil 200 Q6 CC, Duloxetine 20; L/S spine:acute/sub [id number] Fx\par 8/5/20 Pain Management: +Tizanidine 2 mg tid for spasm--didn’t take\par \par \par Today:  was doing better on Miralax 1 cap qd & Benefiber 2 gummies q am , until fall, then more constipated: BM: # 1-2, small w strain, +INC evac bloat\par Wt=90\par * Abd pain—epigastric: less but an emptiness which decr w eating ~ 11am/4 pm \par Hypogastric--no\par LUQ/Flank--no\par LLQ--yes\par * Nausea—no\par * Vomit—no\par * Belching—no\par * Regurgitation—no\par * Ht burn—no\par * Throat Clearing—no\par * Globus—no\par * Cough—no\par * Hoarseness—no\par * Dysphagia—no\par * BMs: # 1q am, then feels like she has to go, but nothing,\par * Constipation—yes\par * Diarrhea—no\par * Bloating—yes\par * Flatulence—no\par * Gurgling—no\par * Melena—no\par * BPBPR—no\par * Anorexia—no\par * Wt. Loss-no\par \par \par

## 2020-08-17 ENCOUNTER — APPOINTMENT (OUTPATIENT)
Dept: PAIN MANAGEMENT | Facility: CLINIC | Age: 75
End: 2020-08-17
Payer: MEDICARE

## 2020-08-17 PROCEDURE — 99443: CPT | Mod: 95

## 2020-08-19 ENCOUNTER — APPOINTMENT (OUTPATIENT)
Dept: INTERNAL MEDICINE | Facility: CLINIC | Age: 75
End: 2020-08-19
Payer: MEDICARE

## 2020-08-19 VITALS
BODY MASS INDEX: 16.93 KG/M2 | SYSTOLIC BLOOD PRESSURE: 150 MMHG | OXYGEN SATURATION: 99 % | WEIGHT: 92 LBS | HEART RATE: 82 BPM | HEIGHT: 62 IN | DIASTOLIC BLOOD PRESSURE: 70 MMHG

## 2020-08-19 PROCEDURE — 99214 OFFICE O/P EST MOD 30 MIN: CPT

## 2020-08-24 ENCOUNTER — APPOINTMENT (OUTPATIENT)
Dept: INTERNAL MEDICINE | Facility: CLINIC | Age: 75
End: 2020-08-24
Payer: MEDICARE

## 2020-08-24 VITALS
WEIGHT: 92 LBS | SYSTOLIC BLOOD PRESSURE: 140 MMHG | HEIGHT: 62 IN | HEART RATE: 103 BPM | DIASTOLIC BLOOD PRESSURE: 70 MMHG | BODY MASS INDEX: 16.93 KG/M2 | OXYGEN SATURATION: 98 %

## 2020-08-24 PROCEDURE — 99213 OFFICE O/P EST LOW 20 MIN: CPT

## 2020-08-24 NOTE — PHYSICAL EXAM
[No Acute Distress] : no acute distress [Well-Appearing] : well-appearing [Well Nourished] : well nourished [Normal Sclera/Conjunctiva] : normal sclera/conjunctiva [PERRL] : pupils equal round and reactive to light [Normal Outer Ear/Nose] : the outer ears and nose were normal in appearance [EOMI] : extraocular movements intact [Normal Oropharynx] : the oropharynx was normal [No JVD] : no jugular venous distention [Thyroid Normal, No Nodules] : the thyroid was normal and there were no nodules present [Supple] : supple [No Lymphadenopathy] : no lymphadenopathy [No Respiratory Distress] : no respiratory distress  [No Accessory Muscle Use] : no accessory muscle use [Clear to Auscultation] : lungs were clear to auscultation bilaterally [Normal Rate] : normal rate  [No Murmur] : no murmur heard [Regular Rhythm] : with a regular rhythm [Normal S1, S2] : normal S1 and S2 [No Carotid Bruits] : no carotid bruits [No Abdominal Bruit] : a ~M bruit was not heard ~T in the abdomen [No Varicosities] : no varicosities [Pedal Pulses Present] : the pedal pulses are present [No Extremity Clubbing/Cyanosis] : no extremity clubbing/cyanosis [Soft] : abdomen soft [No Palpable Aorta] : no palpable aorta [Non-distended] : non-distended [Non Tender] : non-tender [No Masses] : no abdominal mass palpated [Normal Bowel Sounds] : normal bowel sounds [No HSM] : no HSM [Normal Anterior Cervical Nodes] : no anterior cervical lymphadenopathy [Normal Posterior Cervical Nodes] : no posterior cervical lymphadenopathy [No Spinal Tenderness] : no spinal tenderness [No CVA Tenderness] : no CVA  tenderness [No Rash] : no rash [No Joint Swelling] : no joint swelling [Grossly Normal Strength/Tone] : grossly normal strength/tone [Coordination Grossly Intact] : coordination grossly intact [Normal Affect] : the affect was normal [Normal Gait] : normal gait [No Focal Deficits] : no focal deficits [Normal Insight/Judgement] : insight and judgment were intact [de-identified] : 1-2+ ankle edema l > r  [de-identified] : underweight

## 2020-08-24 NOTE — REVIEW OF SYSTEMS
[Recent Change In Weight] : ~T recent weight change [Negative] : Psychiatric [FreeTextEntry2] : lost 5 lbs.

## 2020-08-24 NOTE — ASSESSMENT
[FreeTextEntry1] : Patient continues to lose weight. I will add Remeron 7.5 mg h.s. Patient will call me in 2 weeks to see how she is doing.

## 2020-08-24 NOTE — HISTORY OF PRESENT ILLNESS
[FreeTextEntry1] : Evaluation from the weight status. [de-identified] : Patient has continued to lose about 45 pounds and now weighs 90 pounds. She had a fall 3 weeks ago with a resultant compression fracture of L1. She is being followed by pain management. She complains of chronic constipation on the senna and MiraLax. Her  states she eats like a bird. She walks using a cane. She has balance problems. She denies depression. She sleeps well. She admits that she is afraid to eat much because she will develop a blockage. Her mentation appears normal.

## 2020-08-24 NOTE — ASSESSMENT
[FreeTextEntry1] : Patient with mild ankle edema. This may be related to the use of amlodipine. Patient may stop amlodipine. I have reassured the patient.

## 2020-08-24 NOTE — HISTORY OF PRESENT ILLNESS
[de-identified] : Patient is concerned because for the past few days she has edema of her ankles. She is on Benicar and amlodipine. She feels generally okay otherwise. No shortness of breath. Blood pressure 140/70. She does have 1+ ankle edema left greater than right. There is no calf swelling or tenderness. [FreeTextEntry1] : Is concerned about lower extremity edema

## 2020-08-24 NOTE — PHYSICAL EXAM
[No Acute Distress] : no acute distress [Normal Sclera/Conjunctiva] : normal sclera/conjunctiva [Well Nourished] : well nourished [Well-Appearing] : well-appearing [Normal Outer Ear/Nose] : the outer ears and nose were normal in appearance [PERRL] : pupils equal round and reactive to light [EOMI] : extraocular movements intact [Normal Oropharynx] : the oropharynx was normal [No Lymphadenopathy] : no lymphadenopathy [No JVD] : no jugular venous distention [Supple] : supple [No Respiratory Distress] : no respiratory distress  [Thyroid Normal, No Nodules] : the thyroid was normal and there were no nodules present [No Accessory Muscle Use] : no accessory muscle use [Normal Rate] : normal rate  [Clear to Auscultation] : lungs were clear to auscultation bilaterally [Regular Rhythm] : with a regular rhythm [Normal S1, S2] : normal S1 and S2 [No Murmur] : no murmur heard [No Abdominal Bruit] : a ~M bruit was not heard ~T in the abdomen [No Carotid Bruits] : no carotid bruits [Pedal Pulses Present] : the pedal pulses are present [No Varicosities] : no varicosities [No Edema] : there was no peripheral edema [No Palpable Aorta] : no palpable aorta [No Extremity Clubbing/Cyanosis] : no extremity clubbing/cyanosis [Non-distended] : non-distended [Soft] : abdomen soft [Non Tender] : non-tender [No Masses] : no abdominal mass palpated [Normal Bowel Sounds] : normal bowel sounds [No HSM] : no HSM [Normal Anterior Cervical Nodes] : no anterior cervical lymphadenopathy [Normal Posterior Cervical Nodes] : no posterior cervical lymphadenopathy [No CVA Tenderness] : no CVA  tenderness [No Spinal Tenderness] : no spinal tenderness [No Joint Swelling] : no joint swelling [Grossly Normal Strength/Tone] : grossly normal strength/tone [No Rash] : no rash [Coordination Grossly Intact] : coordination grossly intact [Normal Gait] : normal gait [Normal Affect] : the affect was normal [No Focal Deficits] : no focal deficits [Normal Insight/Judgement] : insight and judgment were intact [de-identified] : underweight

## 2020-08-25 ENCOUNTER — APPOINTMENT (OUTPATIENT)
Dept: GASTROENTEROLOGY | Facility: CLINIC | Age: 75
End: 2020-08-25

## 2020-08-27 ENCOUNTER — APPOINTMENT (OUTPATIENT)
Dept: PAIN MANAGEMENT | Facility: CLINIC | Age: 75
End: 2020-08-27
Payer: MEDICARE

## 2020-08-27 VITALS
BODY MASS INDEX: 16.93 KG/M2 | SYSTOLIC BLOOD PRESSURE: 122 MMHG | DIASTOLIC BLOOD PRESSURE: 70 MMHG | WEIGHT: 92 LBS | HEIGHT: 62 IN | TEMPERATURE: 97.6 F

## 2020-08-27 PROCEDURE — 99214 OFFICE O/P EST MOD 30 MIN: CPT

## 2020-08-27 NOTE — DATA REVIEWED
[FreeTextEntry1] :  08/12/20\par  MRI LUMBAR SPINE\par \par  FINDINGS AT MULTIPLE LEVELS: There is multilevel disc degeneration with loss of normal high signal\par in intervertebral discs on the sagittal T2-weighted sequence. There are anterior vertebral body\par osteophytes, spondylosis deformans.\par \par  FRACTURE: Severity of L1 compression fracture has increased since the previous study. There is now\par approximately two thirds loss of height of the L1 vertebral body as compared to approximately one\par third loss of height on the previous study. The fracture is a recent fracture as there is edema in\par the underlying bone marrow. Edema extends slightly into both pedicles. There is 4 mm retropulsion of\par the superior endplate of L1 into the spinal canal resulting in mild compression of the thecal sac.\par No additional fracture is demonstrated. There is no evidence of underlying neoplastic lesion.\par \par  FINDINGS AT SPECIFIC LEVELS:\par \par      L5-S1: Marked facet osteoarthritis. First degree anterolisthesis. Mild disc bulge extends more\par to the left than the right. Marked left foramen narrowing. Moderate right foramen narrowing.\par Bilateral L5 nerve root compression related to foramen narrowing, left greater than right.\par \par      L4-L5: Mild facet osteoarthritis. Disc bulge. Mild bilateral foramen narrowing.\par \par      L3-L4: Disc thinning and disc bulging. Minimal retrolisthesis.\par \par      L2-L3: Disc thinning and disc bulging. Minimal retrolisthesis.\par \par      L1-L2: No bulge or herniation.\par \par      T12-L1: No bulge or herniation. Mild compression of the ventral aspect of the thecal sac at the\par level of the superior endplate of L1 related to retropulsion of bone from the recent compression\par fracture.\par \par  INTRADURAL SPACE AND CONUS MEDULLARIS: No lesion demonstrated.\par \par  BONE MARROW SIGNAL: Edema in the L1 vertebral body related to recent fracture as noted above.\par \par  ALIGNMENT: First degree anterolisthesis L5-S1. Minimal retrolisthesis L2-L3, L3-L4.\par \par  PARASPINAL SOFT TISSUES: There is no paraspinal mass.\par \par \par \par  IMPRESSION: Increasing loss of height at level of recent L1 compression fracture.\par \par  Foramen stenosis L5-S1 results in bilateral L5 nerve root compression, left greater than right.\par \par \par  07/29/20\par XC L S SPINE 2 OR 3 VIEWS\par \par \par There is an acute/subacute compression deformity of the L1 vertebral body resulting in\par approximately 30 % loss of vertebral body height. There is minimal retrolisthesis of L2 on L3, L3 on\par L4 and L4 on L5 as well as grade 1 anterolisthesis of L5 on S1. There is multilevel discogenic\par degenerative disease of the lumbar spine as manifested by disc space narrowing and endplate\par osteophytosis, most pronounced at L3-L4 and L2-L3.\par \par \par  Impression:\par \par  Acute/subacute L1 compression deformity.\par \par  Discogenic degenerative disease, most pronounced at L2-L3 and L3-L4.\par

## 2020-08-27 NOTE — ASSESSMENT
[FreeTextEntry1] : 74 yof presents w/ severe low back pain which began one week after falling down steps here to discuss potential kyphoplasty. I have again personally reviewed her MRI which does reveal L1 VCF. As she is improving I would recommend to defer kyphoplasty at this time. Continue current medications and PT. If pain worsens consider kyphoplasty.

## 2020-08-27 NOTE — HISTORY OF PRESENT ILLNESS
[3] : 1. What number best describes your pain on average in the past week? 3/10 pain [1] : 3. What number best describes how, during the past week, pain has interfered with your general activity? 1/10 pain [___ wks] : [unfilled] week(s) ago [Constant] : constant [7] : a maximum pain level of 7/10 [Sitting] : sitting [Bending] : bending [Rest] : rest [FreeTextEntry1] : As per my note dated 08/04/20: "74 yof presents w/ severe low back pain which began one week after falling down steps. Pain is axial and non-radiating. Using NSAIDS and acetaminophen w/ minimal relief. Pain is worsening. No relief w/ HEP."\par \par Pt returns for follow-up today, 08/27/20.  She reports that her pain is improving somewhat. She is doing PT. Using ibuprofen and acetaminophen. Here to discuss kyphoplasty. [FreeTextEntry2] : 4 [FreeTextEntry7] : Lower back  pain  [de-identified] : numbness, pins/needles

## 2020-08-31 ENCOUNTER — APPOINTMENT (OUTPATIENT)
Dept: INTERNAL MEDICINE | Facility: CLINIC | Age: 75
End: 2020-08-31
Payer: MEDICARE

## 2020-08-31 VITALS
OXYGEN SATURATION: 99 % | DIASTOLIC BLOOD PRESSURE: 60 MMHG | BODY MASS INDEX: 17.48 KG/M2 | SYSTOLIC BLOOD PRESSURE: 140 MMHG | HEART RATE: 89 BPM | HEIGHT: 62 IN | WEIGHT: 95 LBS

## 2020-08-31 VITALS — TEMPERATURE: 98.8 F

## 2020-08-31 PROCEDURE — 99213 OFFICE O/P EST LOW 20 MIN: CPT

## 2020-08-31 NOTE — ASSESSMENT
[FreeTextEntry1] : There is no evidence of otitis. Patient is to monitor right ear discomfort. In terms of her weight she is now up 1 pound in weight is 96. To carefully monitor her weight.

## 2020-08-31 NOTE — PHYSICAL EXAM
[No Acute Distress] : no acute distress [Well Nourished] : well nourished [Well-Appearing] : well-appearing [PERRL] : pupils equal round and reactive to light [Normal Sclera/Conjunctiva] : normal sclera/conjunctiva [EOMI] : extraocular movements intact [Normal Outer Ear/Nose] : the outer ears and nose were normal in appearance [Normal Oropharynx] : the oropharynx was normal [No JVD] : no jugular venous distention [No Lymphadenopathy] : no lymphadenopathy [Thyroid Normal, No Nodules] : the thyroid was normal and there were no nodules present [Supple] : supple [No Respiratory Distress] : no respiratory distress  [Clear to Auscultation] : lungs were clear to auscultation bilaterally [No Accessory Muscle Use] : no accessory muscle use [Normal Rate] : normal rate  [Regular Rhythm] : with a regular rhythm [Normal S1, S2] : normal S1 and S2 [No Murmur] : no murmur heard [No Carotid Bruits] : no carotid bruits [No Abdominal Bruit] : a ~M bruit was not heard ~T in the abdomen [No Varicosities] : no varicosities [Pedal Pulses Present] : the pedal pulses are present [No Palpable Aorta] : no palpable aorta [No Extremity Clubbing/Cyanosis] : no extremity clubbing/cyanosis [Soft] : abdomen soft [Non-distended] : non-distended [Non Tender] : non-tender [No Masses] : no abdominal mass palpated [Normal Bowel Sounds] : normal bowel sounds [No HSM] : no HSM [Normal Posterior Cervical Nodes] : no posterior cervical lymphadenopathy [Normal Anterior Cervical Nodes] : no anterior cervical lymphadenopathy [No CVA Tenderness] : no CVA  tenderness [No Spinal Tenderness] : no spinal tenderness [No Joint Swelling] : no joint swelling [Grossly Normal Strength/Tone] : grossly normal strength/tone [No Rash] : no rash [Coordination Grossly Intact] : coordination grossly intact [No Focal Deficits] : no focal deficits [Normal Gait] : normal gait [Normal Affect] : the affect was normal [Normal Insight/Judgement] : insight and judgment were intact [de-identified] : 1-2+ ankle edema l > r  [de-identified] : underweight

## 2020-08-31 NOTE — HISTORY OF PRESENT ILLNESS
[FreeTextEntry1] : Right ear pain. [de-identified] : Patient all this morning with right ear pain fullness of her head feeling she is concerned she may have a cold but she has no rhinorrhea sore throat cough fever or chills. Her right ear is feeling a bit better now. She hears fine.

## 2020-09-21 ENCOUNTER — APPOINTMENT (OUTPATIENT)
Dept: INTERNAL MEDICINE | Facility: CLINIC | Age: 75
End: 2020-09-21
Payer: MEDICARE

## 2020-09-21 VITALS
WEIGHT: 97 LBS | OXYGEN SATURATION: 98 % | HEIGHT: 62 IN | HEART RATE: 82 BPM | DIASTOLIC BLOOD PRESSURE: 80 MMHG | SYSTOLIC BLOOD PRESSURE: 140 MMHG | BODY MASS INDEX: 17.85 KG/M2

## 2020-09-21 PROCEDURE — G0008: CPT

## 2020-09-21 PROCEDURE — 90662 IIV NO PRSV INCREASED AG IM: CPT

## 2020-09-21 PROCEDURE — 99213 OFFICE O/P EST LOW 20 MIN: CPT | Mod: 25

## 2020-09-21 NOTE — HISTORY OF PRESENT ILLNESS
[FreeTextEntry1] : Evaluation for weight loss. [de-identified] : Patient has gained 3 pounds and now weighs 98 pounds. She is hungry frequently with the use of Remeron. Her back pain is slowly getting better. The ankle swelling is better off Norvasc. Current blood pressure 130/70.

## 2020-09-21 NOTE — PHYSICAL EXAM
[No Acute Distress] : no acute distress [Well Nourished] : well nourished [Well-Appearing] : well-appearing [Normal Sclera/Conjunctiva] : normal sclera/conjunctiva [PERRL] : pupils equal round and reactive to light [EOMI] : extraocular movements intact [Normal Outer Ear/Nose] : the outer ears and nose were normal in appearance [Normal Oropharynx] : the oropharynx was normal [No JVD] : no jugular venous distention [No Lymphadenopathy] : no lymphadenopathy [Supple] : supple [Thyroid Normal, No Nodules] : the thyroid was normal and there were no nodules present [No Respiratory Distress] : no respiratory distress  [No Accessory Muscle Use] : no accessory muscle use [Clear to Auscultation] : lungs were clear to auscultation bilaterally [Normal Rate] : normal rate  [Regular Rhythm] : with a regular rhythm [Normal S1, S2] : normal S1 and S2 [No Murmur] : no murmur heard [No Carotid Bruits] : no carotid bruits [No Abdominal Bruit] : a ~M bruit was not heard ~T in the abdomen [No Varicosities] : no varicosities [Pedal Pulses Present] : the pedal pulses are present [No Palpable Aorta] : no palpable aorta [No Extremity Clubbing/Cyanosis] : no extremity clubbing/cyanosis [Soft] : abdomen soft [Non Tender] : non-tender [Non-distended] : non-distended [No Masses] : no abdominal mass palpated [No HSM] : no HSM [Normal Bowel Sounds] : normal bowel sounds [Normal Posterior Cervical Nodes] : no posterior cervical lymphadenopathy [Normal Anterior Cervical Nodes] : no anterior cervical lymphadenopathy [No CVA Tenderness] : no CVA  tenderness [No Spinal Tenderness] : no spinal tenderness [No Joint Swelling] : no joint swelling [Grossly Normal Strength/Tone] : grossly normal strength/tone [No Rash] : no rash [Coordination Grossly Intact] : coordination grossly intact [No Focal Deficits] : no focal deficits [Normal Gait] : normal gait [Normal Affect] : the affect was normal [Normal Insight/Judgement] : insight and judgment were intact [de-identified] : underweight [de-identified] : 1-2+ ankle edema l > r

## 2020-09-21 NOTE — ASSESSMENT
[FreeTextEntry1] : Patient appears to be slowly gaining weight. She is to continue current therapy. High dose flu vaccine has been given.

## 2020-10-29 ENCOUNTER — APPOINTMENT (OUTPATIENT)
Dept: INTERNAL MEDICINE | Facility: CLINIC | Age: 75
End: 2020-10-29
Payer: MEDICARE

## 2020-10-29 PROCEDURE — 36415 COLL VENOUS BLD VENIPUNCTURE: CPT

## 2020-11-05 LAB
25(OH)D3 SERPL-MCNC: 39.7 NG/ML
ALBUMIN SERPL ELPH-MCNC: 4.7 G/DL
ALP BLD-CCNC: 107 U/L
ALT SERPL-CCNC: 35 U/L
ANION GAP SERPL CALC-SCNC: 11 MMOL/L
AST SERPL-CCNC: 30 U/L
BASOPHILS # BLD AUTO: 0.03 K/UL
BASOPHILS NFR BLD AUTO: 0.6 %
BILIRUB SERPL-MCNC: 0.4 MG/DL
BUN SERPL-MCNC: 20 MG/DL
CALCIUM SERPL-MCNC: 9.7 MG/DL
CHLORIDE SERPL-SCNC: 96 MMOL/L
CHOLEST SERPL-MCNC: 196 MG/DL
CO2 SERPL-SCNC: 26 MMOL/L
CREAT SERPL-MCNC: 0.63 MG/DL
EOSINOPHIL # BLD AUTO: 0.12 K/UL
EOSINOPHIL NFR BLD AUTO: 2.5 %
ESTIMATED AVERAGE GLUCOSE: 114 MG/DL
GLUCOSE SERPL-MCNC: 89 MG/DL
HBA1C MFR BLD HPLC: 5.6 %
HCT VFR BLD CALC: 36.2 %
HDLC SERPL-MCNC: 98 MG/DL
HGB BLD-MCNC: 11.5 G/DL
IMM GRANULOCYTES NFR BLD AUTO: 0.4 %
LDLC SERPL CALC-MCNC: 86 MG/DL
LYMPHOCYTES # BLD AUTO: 1.36 K/UL
LYMPHOCYTES NFR BLD AUTO: 28.5 %
MAN DIFF?: NORMAL
MCHC RBC-ENTMCNC: 31.8 GM/DL
MCHC RBC-ENTMCNC: 31.9 PG
MCV RBC AUTO: 100.3 FL
MONOCYTES # BLD AUTO: 0.5 K/UL
MONOCYTES NFR BLD AUTO: 10.5 %
NEUTROPHILS # BLD AUTO: 2.74 K/UL
NEUTROPHILS NFR BLD AUTO: 57.5 %
NONHDLC SERPL-MCNC: 98 MG/DL
PLATELET # BLD AUTO: 312 K/UL
POTASSIUM SERPL-SCNC: 4.7 MMOL/L
PROT SERPL-MCNC: 7.4 G/DL
RBC # BLD: 3.61 M/UL
RBC # FLD: 13 %
SODIUM SERPL-SCNC: 132 MMOL/L
TRIGL SERPL-MCNC: 62 MG/DL
TSH SERPL-ACNC: 1.46 UIU/ML
VIT B12 SERPL-MCNC: 537 PG/ML
WBC # FLD AUTO: 4.77 K/UL

## 2020-11-12 ENCOUNTER — APPOINTMENT (OUTPATIENT)
Dept: GASTROENTEROLOGY | Facility: CLINIC | Age: 75
End: 2020-11-12
Payer: MEDICARE

## 2020-11-12 VITALS
HEIGHT: 62 IN | BODY MASS INDEX: 18.58 KG/M2 | SYSTOLIC BLOOD PRESSURE: 132 MMHG | WEIGHT: 101 LBS | DIASTOLIC BLOOD PRESSURE: 70 MMHG

## 2020-11-12 PROCEDURE — 99214 OFFICE O/P EST MOD 30 MIN: CPT

## 2020-11-12 NOTE — HISTORY OF PRESENT ILLNESS
[de-identified] : \par PCP: Josh\par \par 76 yo F h/o Anxiety, HTN, HLD, Anemia/ neutropenia\par h/o Colon Ca ~ 3.5cm from ICV: s/p R colectomy 7/2000 + 5FU and Leucovorin\par had routine f/u imaging and colonoscopy\par 10/26/18-11/5/18 s/p sbo p/w abd pain, N, Bilious vomiting\par CT w po/IVC: transition pt w/i midline lower abdomen/superior pelvis w adjacent distended non-contrast fluid-filled sb loops extending into pelvis and demonstrating mucosal enhancement w surrounding ascites and mesenteric edema. small midline ventral abdominal  hernia into which a sht segment of mid-TV colon protrudes thru. \par 11/30-- 12/2/19 s/p Adm for sbo; p/w abd pain, N/V, diarrhea; CT w po/IVC: sbo w transition pt w/i pelvis\par 3/5/19: qh=916, usu 120\par 9/27/19: wt=94, prn donnatal for cramping\par 1/17/20: wt=90\par 2/20/20 Dr. foto: for wt loss, rash 2 mo prior on hands, had seen derm told of +BEN and ? 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 callejas: epigastric burn, Inc Gas on Pepcid 20mg--> omep 20mg qd\par 5/19/20: Dr. Callejas: occas upper GI distress, always feels hungry, + constipation on Senakot\par Labs: WBC=2.6, Hgb=11.8 , BUN=23, creat=0.5\par \par 6/17/20 CT ABD/PEL w po C: neg\par \par Init CC 6/30/20:  long h/o constipation x years, worsened after SBO's--> went to [facility], also lost a lot wt after each hospitalization, never able to gain it back. wt=93\par over last 1-2 mo c/o epigastric/hypogastric pain: ache--> Left upper quad and flank\par relieved by passing gas\par BMs: #4 q am, then feels like she has to go, but nothing, occas loose/mushy BM later\par + Bloat, no melena, brbpr\par + epigastric burn/ht burn, + belch\par No anorexia, or early satiety\par 7/21/20: no real change, MOM 30cc--> Incr cramping and stool frequency, gas in Left colon which\par released w BMs: # 4 q am, then feels like she has to go, but nothing, occas loose/mushy BM later\par Abd binder felt restrictive--too much , + Bloat \par Wt=94, epigastric: less but an emptiness which decr w eating ~ 11am/4 pm \par Labs:ANCA/ASCA, Celiac/Food Allergy panels--neg, Ca19-9, CEA-wnl, CRP <0.1\par \par 7/29/20: : c/o acute LBP:s/pfall on buttocks ~ 2 wks ago\par Rx w Advil 200 Q6 CC, Duloxetine 20; L/S spine:acute/sub [id number] Fx\par 8/5/20 Pain Management: +Tizanidine 2 mg tid for spasm--didn’t take\par 8/13/20: was doing better on Miralax 1 cap qd & Benefiber 2 gummies q am , until fall, then more constipated: BM: # 1-2, small w strain,1q am, then feels like she has to go, but nothing,\par  +INC evac bloat, wt=90\par * Abd pain—epigastric: less but an emptiness which decr w eating ~ 11am/4 pm \par \par Saw Dr. Cooley--felt not to have PD, but evidence of " mini strokes", to see Dr Bennett and to get\par carotid study and  repeat MRI\par Neuropsych eval reportedly w ? attention deficit\par Today:   on Miralax 1/8  cap qd & Benefiber 2 gummies q am / 1 w lunch\par \par Wt=up to 101lbs( gained 11lbs in 3 months) \par BMs: more formed, less urgency, but has a fear of SBO if holds back BM, so just releases it willingly in adult diaper--> advise that obstruction is unlikely form marco a the anal sphincter and that actually will worsen sphincter control by atrophy of non-use\par \par * Abd pain—epigastric: less but an emptiness which decr w eating ~ 11am/4 pm \par Hypogastric--no\par LUQ/Flank--no\par LLQ--yes feel like gas \par * Nausea—no\par * Vomit—no\par * Belching—no\par * Regurgitation—no\par * Ht burn—no\par * Throat Clearing—no\par * Globus—no\par * Cough—no\par * Hoarseness—no\par * Dysphagia—no\par * BMs: # 5, 3-4x/d \par * Constipation—better\par * Diarrhea—no\par * Bloating—less\par * Flatulence—no\par * Gurgling—no\par * Melena—no\par * BPBPR—no\par * Anorexia—no\par * Wt. Loss-no\par \par \par

## 2020-11-12 NOTE — ASSESSMENT
[FreeTextEntry1] : Abdominal Pain: epigastric--better\par                           L-sided\par assoc w constipation/bloat\par dyspepsia \par wt loss--had  avoidance 2/2 constipation/ Dulox--decr appetite\par mild anemia\par PE w reducible incisional hernia\par \par --s/p R. colon resection 7/2000 for Colon Ca\par s/p sbo x 2\par CT Abd/Pelv w po C: diverticulosis--6/2020\par 12/4/18 SBFT: wnl\par \par Perhaps some  low grade partial obstructive physiology w hernia/adhesive dis \par \par DDX: SIBO w MCTD, s/p RHC\par          Malabsorption\par          Colonic Neoplasia\par          IBD --unlikely w normal CRP/IBD panel                                                                                                                            Functional Bowel D/O--more likely   \par          GERD/Gastritis                                                                                                          \par \par      \par \par P: diet is LR, lactose free, low fat\par will need to transition diet to have provide some bulking  agent: Benefiber 2 tsp w breakfast / 1 w lunch\par anti-reflux diet\par adequate fluid \par reg exercise\par \par check stool for fecal fat/calprotectin --not returned \par     Senakot 2 qhs try to taoer\par     MOM 15-30cc q afternoon --held 2/2 cramping/inc stool freq\par     Miralax 1 cap--> 1/2 BID-->1/4 BID--> 1/8 BID \par     Benefiber Gummies 2 q am/ 1 w lunch\par     D/C Cymbalta 20--contipating \par    Remeron 7.5mg q hs \par     Omep 20 q am & Pepcid pre dinner \par     Florastor 1 qd \par     Trial IB ghada 1 q am \par     Abd binder --slowly increase usage to decondition\par \par    EGD\par   Colonoscopy\par   neuromodulation w potentials : / Buspar 5 bid/ FDgard/ Creon / rotating ABX\par \par surgical consult for hernia repair at tertiary center\par \par \par \par 2. Diverticulosis:  well – controlled.  No pain,  infection,  bleeding\par * Discussed the potential complications of perforation,  pain,  infection,  bleeding \par * Low – Fiber Diet was reviewed and emphasized\par * 6  --  8 cups of decaffeinated fluid daily\par \par \par \par \par \par \par 3. Hemorrhoids:  well – controlled.  No pain,  swelling,  itch,  bleeding\par * Discussed the potential complications of thrombosis,  pain,  infection,  swelling, itching,  bleeding \par * Low  – Fiber Diet was reviewed and emphasized\par * 6  --  8 cups of decaffeinated fluid daily\par * Sitz Bathes BID,  No:  Anusol HC  Suppos / Cream  NM BID\par * No:  Tucks BID,   No:  Balneol Lotion,   No:  Calmoseptine Oint,   ++ Prep H prn\par * No:  Colorectal surgical evaluation for possible ablation \par \par \par \par \par \par \par Informed Consent:\par * The risks & Benefits ofwere discussed w patient.\par * This included but was not limited to perforation, bleeding, sedation /med rxns possibly requiring surgery, blood transfusions, antibiotics & CPR/Intubation.\par * Pt. understands & agrees to the procedures.\par * Pt. advised to D/C  ASA/NSAIDs  7  Days \par * [ +++ ]  Dulcolax / Miralax / Mag. Citrate ,  [     ] Prepopik/ Clenpiq ,  [     ] Osmo Prep,  [    ] GoLytely,  prep. reviewed w Pt.\par * Hold  [           ] AM of procedure.\par * Hold  [           ] PM of procedure.\par * Take  [           ] PM of procedure.\par * Take  [           ] AM of procedure.\par \par

## 2020-11-16 ENCOUNTER — APPOINTMENT (OUTPATIENT)
Dept: CARDIOLOGY | Facility: CLINIC | Age: 75
End: 2020-11-16
Payer: MEDICARE

## 2020-11-16 VITALS
HEIGHT: 62 IN | SYSTOLIC BLOOD PRESSURE: 150 MMHG | BODY MASS INDEX: 18.77 KG/M2 | WEIGHT: 102 LBS | HEART RATE: 91 BPM | DIASTOLIC BLOOD PRESSURE: 76 MMHG

## 2020-11-16 DIAGNOSIS — D64.9 ANEMIA, UNSPECIFIED: ICD-10-CM

## 2020-11-16 DIAGNOSIS — D70.9 NEUTROPENIA, UNSPECIFIED: ICD-10-CM

## 2020-11-16 DIAGNOSIS — E55.9 VITAMIN D DEFICIENCY, UNSPECIFIED: ICD-10-CM

## 2020-11-16 DIAGNOSIS — R76.8 OTHER SPECIFIED ABNORMAL IMMUNOLOGICAL FINDINGS IN SERUM: ICD-10-CM

## 2020-11-16 DIAGNOSIS — E87.1 HYPO-OSMOLALITY AND HYPONATREMIA: ICD-10-CM

## 2020-11-16 DIAGNOSIS — M40.14 OTHER SECONDARY KYPHOSIS, THORACIC REGION: ICD-10-CM

## 2020-11-16 PROCEDURE — 99214 OFFICE O/P EST MOD 30 MIN: CPT

## 2020-11-16 PROCEDURE — 93000 ELECTROCARDIOGRAM COMPLETE: CPT | Mod: 59

## 2020-11-16 PROCEDURE — 0296T: CPT

## 2020-11-16 RX ORDER — AMLODIPINE BESYLATE 2.5 MG/1
2.5 TABLET ORAL DAILY
Qty: 90 | Refills: 3 | Status: DISCONTINUED | COMMUNITY
Start: 2020-01-17 | End: 2020-11-16

## 2020-11-16 NOTE — HISTORY OF PRESENT ILLNESS
[FreeTextEntry1] : This 75 year old female is referred by Dr Cooley for MRI findings of a tiny right cerebellar chronic infarction and right anterior thalamic chronic lacunar infarct.\par She denies a cardiac history, she denies chest pain, dyspnea, palpitations or syncope.\par

## 2020-11-19 ENCOUNTER — RX RENEWAL (OUTPATIENT)
Age: 75
End: 2020-11-19

## 2020-11-30 ENCOUNTER — APPOINTMENT (OUTPATIENT)
Dept: INTERNAL MEDICINE | Facility: CLINIC | Age: 75
End: 2020-11-30
Payer: MEDICARE

## 2020-11-30 VITALS
HEART RATE: 89 BPM | BODY MASS INDEX: 19.32 KG/M2 | OXYGEN SATURATION: 83 % | SYSTOLIC BLOOD PRESSURE: 140 MMHG | DIASTOLIC BLOOD PRESSURE: 70 MMHG | TEMPERATURE: 97.9 F | WEIGHT: 105 LBS | HEIGHT: 62 IN

## 2020-11-30 PROCEDURE — 0298T: CPT

## 2020-11-30 PROCEDURE — 99214 OFFICE O/P EST MOD 30 MIN: CPT

## 2020-11-30 NOTE — HISTORY OF PRESENT ILLNESS
[FreeTextEntry1] : Followup underweight status. [de-identified] : Patient has gained about 3-4 pounds and now weighs 105. She is seen by neurology and had an MRI of the brain which reveals 2 tiny lacunar infarcts. She is scheduled to have an echocardiogram of the heart and carotid ultrasound. She generally is feeling better. She has less abdominal pain . She sleeps well. She still has to use a cane for balance.

## 2020-11-30 NOTE — PHYSICAL EXAM
[No Acute Distress] : no acute distress [Well Nourished] : well nourished [Well-Appearing] : well-appearing [Normal Sclera/Conjunctiva] : normal sclera/conjunctiva [PERRL] : pupils equal round and reactive to light [EOMI] : extraocular movements intact [Normal Outer Ear/Nose] : the outer ears and nose were normal in appearance [Normal Oropharynx] : the oropharynx was normal [No JVD] : no jugular venous distention [No Lymphadenopathy] : no lymphadenopathy [Supple] : supple [Thyroid Normal, No Nodules] : the thyroid was normal and there were no nodules present [No Respiratory Distress] : no respiratory distress  [No Accessory Muscle Use] : no accessory muscle use [Clear to Auscultation] : lungs were clear to auscultation bilaterally [Normal Rate] : normal rate  [Regular Rhythm] : with a regular rhythm [Normal S1, S2] : normal S1 and S2 [No Murmur] : no murmur heard [No Carotid Bruits] : no carotid bruits [No Abdominal Bruit] : a ~M bruit was not heard ~T in the abdomen [No Varicosities] : no varicosities [Pedal Pulses Present] : the pedal pulses are present [No Palpable Aorta] : no palpable aorta [No Extremity Clubbing/Cyanosis] : no extremity clubbing/cyanosis [Soft] : abdomen soft [Non Tender] : non-tender [Non-distended] : non-distended [No Masses] : no abdominal mass palpated [No HSM] : no HSM [Normal Bowel Sounds] : normal bowel sounds [Normal Posterior Cervical Nodes] : no posterior cervical lymphadenopathy [Normal Anterior Cervical Nodes] : no anterior cervical lymphadenopathy [No CVA Tenderness] : no CVA  tenderness [No Spinal Tenderness] : no spinal tenderness [No Joint Swelling] : no joint swelling [Grossly Normal Strength/Tone] : grossly normal strength/tone [No Rash] : no rash [Coordination Grossly Intact] : coordination grossly intact [No Focal Deficits] : no focal deficits [Normal Gait] : normal gait [Normal Affect] : the affect was normal [Normal Insight/Judgement] : insight and judgment were intact [de-identified] : underweight [de-identified] : 1-2+ ankle edema l > r

## 2020-11-30 NOTE — ASSESSMENT
[FreeTextEntry1] : Patient is doing better overall. Current blood pressure 140/70. Will check results of ultrasound of the carotids and 2-D echo. She also has a two-week zio patch which will be checked as well.

## 2020-12-15 ENCOUNTER — RESULT REVIEW (OUTPATIENT)
Age: 75
End: 2020-12-15

## 2020-12-16 ENCOUNTER — APPOINTMENT (OUTPATIENT)
Dept: CARDIOLOGY | Facility: CLINIC | Age: 75
End: 2020-12-16
Payer: MEDICARE

## 2020-12-16 PROCEDURE — 36415 COLL VENOUS BLD VENIPUNCTURE: CPT

## 2020-12-17 LAB
ALBUMIN SERPL ELPH-MCNC: 4.4 G/DL
ALP BLD-CCNC: 103 U/L
ALT SERPL-CCNC: 30 U/L
AST SERPL-CCNC: 31 U/L
BILIRUB DIRECT SERPL-MCNC: 0.1 MG/DL
BILIRUB INDIRECT SERPL-MCNC: 0.2 MG/DL
BILIRUB SERPL-MCNC: 0.3 MG/DL
CHOLEST SERPL-MCNC: 173 MG/DL
HDLC SERPL-MCNC: 96 MG/DL
LDLC SERPL CALC-MCNC: 65 MG/DL
NONHDLC SERPL-MCNC: 77 MG/DL
PROT SERPL-MCNC: 6.9 G/DL
TRIGL SERPL-MCNC: 59 MG/DL

## 2021-01-26 ENCOUNTER — APPOINTMENT (OUTPATIENT)
Dept: INTERNAL MEDICINE | Facility: CLINIC | Age: 76
End: 2021-01-26
Payer: MEDICARE

## 2021-01-26 VITALS
HEART RATE: 91 BPM | OXYGEN SATURATION: 95 % | SYSTOLIC BLOOD PRESSURE: 120 MMHG | TEMPERATURE: 97.8 F | DIASTOLIC BLOOD PRESSURE: 80 MMHG | BODY MASS INDEX: 18.95 KG/M2 | WEIGHT: 103 LBS | HEIGHT: 62 IN

## 2021-01-26 PROCEDURE — 99213 OFFICE O/P EST LOW 20 MIN: CPT

## 2021-01-26 NOTE — HISTORY OF PRESENT ILLNESS
[FreeTextEntry1] : Rash on buttocks times for a few weeks [de-identified] : Patient has a painful rash around the leg rectal region for about one to 2 months. She has seen dermatology and was placed on an antifungal cream. She states it is not getting better. The rash is painful. She is using sitz baths. She takes MiraLax and Senokot and has frequent bowel movements.

## 2021-01-26 NOTE — PHYSICAL EXAM
[No Acute Distress] : no acute distress [Well Nourished] : well nourished [Well Developed] : well developed [Well-Appearing] : well-appearing [Normal Sclera/Conjunctiva] : normal sclera/conjunctiva [PERRL] : pupils equal round and reactive to light [EOMI] : extraocular movements intact [Normal Outer Ear/Nose] : the outer ears and nose were normal in appearance [Normal Oropharynx] : the oropharynx was normal [No JVD] : no jugular venous distention [No Lymphadenopathy] : no lymphadenopathy [Supple] : supple [Thyroid Normal, No Nodules] : the thyroid was normal and there were no nodules present [No Respiratory Distress] : no respiratory distress  [No Accessory Muscle Use] : no accessory muscle use [Clear to Auscultation] : lungs were clear to auscultation bilaterally [Normal Rate] : normal rate  [Regular Rhythm] : with a regular rhythm [Normal S1, S2] : normal S1 and S2 [No Murmur] : no murmur heard [No Carotid Bruits] : no carotid bruits [No Abdominal Bruit] : a ~M bruit was not heard ~T in the abdomen [No Varicosities] : no varicosities [Pedal Pulses Present] : the pedal pulses are present [No Edema] : there was no peripheral edema [No Palpable Aorta] : no palpable aorta [No Extremity Clubbing/Cyanosis] : no extremity clubbing/cyanosis [Soft] : abdomen soft [Non Tender] : non-tender [Non-distended] : non-distended [No Masses] : no abdominal mass palpated [No HSM] : no HSM [Normal Bowel Sounds] : normal bowel sounds [Normal Posterior Cervical Nodes] : no posterior cervical lymphadenopathy [Normal Anterior Cervical Nodes] : no anterior cervical lymphadenopathy [No CVA Tenderness] : no CVA  tenderness [No Spinal Tenderness] : no spinal tenderness [No Joint Swelling] : no joint swelling [Grossly Normal Strength/Tone] : grossly normal strength/tone [Coordination Grossly Intact] : coordination grossly intact [No Focal Deficits] : no focal deficits [Normal Gait] : normal gait [Deep Tendon Reflexes (DTR)] : deep tendon reflexes were 2+ and symmetric [Normal Affect] : the affect was normal [Normal Insight/Judgement] : insight and judgment were intact [de-identified] : erythematous rash around anal region bilateral

## 2021-02-08 ENCOUNTER — RX RENEWAL (OUTPATIENT)
Age: 76
End: 2021-02-08

## 2021-02-16 ENCOUNTER — NON-APPOINTMENT (OUTPATIENT)
Age: 76
End: 2021-02-16

## 2021-02-18 ENCOUNTER — APPOINTMENT (OUTPATIENT)
Dept: GASTROENTEROLOGY | Facility: CLINIC | Age: 76
End: 2021-02-18
Payer: MEDICARE

## 2021-02-18 PROCEDURE — 99215 OFFICE O/P EST HI 40 MIN: CPT | Mod: 95

## 2021-02-18 NOTE — ASSESSMENT
[FreeTextEntry1] : Abdominal Pain: epigastric--better\par                           L-sided\par assoc w constipation/bloat; dyspepsia \par \par Recently w Diarrhea: LBMs, stool frequency & Grecia-anal dermatitis\par \par wt loss--had  avoidance 2/2 constipation/ Dulox--decr appetite: better on Remeron \par mild anemia\par PE w reducible incisional hernia\par \par --s/p R. colon resection 7/2000 for Colon Ca\par s/p sbo x 2\par CT Abd/Pelv w po C: diverticulosis--6/2020\par 12/4/18 SBFT: wnl\par \par Perhaps some  low grade partial obstructive physiology w hernia/adhesive dis \par \par DDX: SIBO w MCTD, s/p RHC\par          Malabsorption\par          Colonic Neoplasia\par          IBD --unlikely w normal CRP/IBD panel                                                                                                                            Functional Bowel D/O--more likely   \par          GERD/Gastritis                                                                                                          \par \par      \par \par P: diet is LR, lactose free, low fat\par will need to transition diet to have provide some bulking  agent: Benefiber 2 tsp w breakfast / 1 w lunch\par anti-reflux diet\par adequate fluid \par reg exercise\par \par check stool for fecal fat/calprotectin --not returned \par     Senakot 2 qhs try to taper\par     MOM 15-30cc q afternoon --held 2/2 cramping/inc stool freq\par     Miralax 1 cap--> 1/2 BID-->1/4 BID--> 1/8 Tsp  BID --> qd\par     Benefiber Gummies 2 q am/ 1 w lunch\par     D/C Cymbalta 20--constipating \par    Remeron 7.5mg q hs \par    ? additonal neuromodulation w potentials : / Buspar 2.5 bid/ FDgard/ Creon / rotating ABX\par     Omep 20 q am & Pepcid pre dinner \par     Florastor 1 qd \par     Trial IB ghada 1 q am \par     Local care w Medicated powder: light dusting, followed by Ketocon/HC cream , clinton, then vaseline TID\par     No sitz bathes w vinegar, pat dry\par     Abd binder --slowly increase usage to decondition\par \par    EGD\par   Colonoscopy\par   \par \par surgical consult for hernia repair at tertiary center\par \par \par \par 2. Diverticulosis:  well – controlled.  No pain,  infection,  bleeding\par * Discussed the potential complications of perforation,  pain,  infection,  bleeding \par * Low – Fiber Diet was reviewed and emphasized\par * 6  --  8 cups of decaffeinated fluid daily\par \par \par \par \par \par \par 3. Hemorrhoids:  well – controlled.  No pain,  swelling,  itch,  bleeding\par * Discussed the potential complications of thrombosis,  pain,  infection,  swelling, itching,  bleeding \par * Low  – Fiber Diet was reviewed and emphasized\par * 6  --  8 cups of decaffeinated fluid daily\par * Sitz Bathes BID,  No:  Anusol HC  Suppos / Cream  MT BID\par * No:  Tucks BID,   No:  Balneol Lotion,   No:  Calmoseptine Oint,   ++ Prep H prn\par * No:  Colorectal surgical evaluation for possible ablation \par \par \par \par \par 4. Grecia Anal Dermatitis: Recently w Diarrhea: LBMs, stool frequency: improving but slowly\par     \par     Benefiber Gummies 2 q am/ 1 w lunch\par     Miralax dropped to 1/8 tsp q am \par    Remeron 7.5mg q hs \par     Florastor 1 qd \par     Trial IB ghada 1 q am \par     Local care w Medicated powder: light dusting, followed by Ketocon/HC cream , clinton, then vaseline TID\par     No sitz bathes w vinegar, pat dry\par \par \par \par \par Informed Consent:\par * The risks & Benefits ofwere discussed w patient.\par * This included but was not limited to perforation, bleeding, sedation /med rxns possibly requiring surgery, blood transfusions, antibiotics & CPR/Intubation.\par * Pt. understands & agrees to the procedures.\par * Pt. advised to D/C  ASA/NSAIDs  7  Days \par * [ +++ ]  Dulcolax / Miralax / Mag. Citrate ,  [     ] Prepopik/ Clenpiq ,  [     ] Osmo Prep,  [    ] GoLytely,  prep. reviewed w Pt.\par * Hold  [           ] AM of procedure.\par * Hold  [           ] PM of procedure.\par * Take  [           ] PM of procedure.\par * Take  [           ] AM of procedure.\par \par

## 2021-02-18 NOTE — HISTORY OF PRESENT ILLNESS
[Home] : at home, [unfilled] , at the time of the visit. [Medical Office: ([facility])___] : at the medical office located in  [Spouse] : spouse [Verbal consent obtained from patient] : the patient, [unfilled] [de-identified] : \par \par  \par \par \par The patient  called for tele [facility]  visit.\par \par This HPI reflects a summary and review of records : including previous and most recent  Labs, body imaging, consults and progress notes, operative and pathology reports, EKG reports, ED records, found in [facility], [facility] and/or [facility]\par \par \par After obtaining the verbal consent noted above,  I spoke w pt on the phone after the pt called wanting to discuss her/his concerns of: constipation, bloat, weston-anal discomfort, gerd\par \par \par \par PCP: Josh\par \par 76 yo F h/o Anxiety, HTN, HLD, Anemia/ neutropenia\par h/o Colon Ca ~ 3.5cm from ICV: s/p R colectomy 7/2000 + 5FU and Leucovorin\par had routine f/u imaging and colonoscopy\par 10/26/18-11/5/18 s/p sbo p/w abd pain, N, Bilious vomiting\par CT w po/IVC: transition pt w/i midline lower abdomen/superior pelvis w adjacent distended non-contrast fluid-filled sb loops extending into pelvis and demonstrating mucosal enhancement w surrounding ascites and mesenteric edema. small midline ventral abdominal  hernia into which a sht segment of mid-TV colon protrudes thru. \par 11/30-- 12/2/19 s/p Adm for sbo; p/w abd pain, N/V, diarrhea; CT w po/IVC: sbo w transition pt w/i pelvis\par 3/5/19: xd=448, usu 120\par 9/27/19: wt=94, prn donnatal for cramping\par 1/17/20: wt=90\par 2/20/20 Dr. foto: for wt loss, rash 2 mo prior on hands, had seen derm told of +BEN and ? 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 moreno: epigastric burn, Inc Gas on Pepcid 20mg--> omep 20mg qd\par 5/19/20: Dr. Moreno: occas upper GI distress, always feels hungry, + constipation on Senakot\par Labs: WBC=2.6, Hgb=11.8 , BUN=23, creat=0.5\par \par 6/17/20 CT ABD/PEL w po C: neg\par \par Init CC 6/30/20:  long h/o constipation x years, worsened after SBO's--> went to [facility], also lost a lot wt after each hospitalization, never able to gain it back. wt=93\par over last 1-2 mo c/o epigastric/hypogastric pain: ache--> Left upper quad and flank\par relieved by passing gas\par BMs: #4 q am, then feels like she has to go, but nothing, occas loose/mushy BM later\par + Bloat, no melena, brbpr\par + epigastric burn/ht burn, + belch\par No anorexia, or early satiety\par 7/21/20: no real change, MOM 30cc--> Incr cramping and stool frequency, gas in Left colon which\par released w BMs: # 4 q am, then feels like she has to go, but nothing, occas loose/mushy BM later\par Abd binder felt restrictive--too much , + Bloat \par Wt=94, epigastric: less but an emptiness which decr w eating ~ 11am/4 pm \par Labs:ANCA/ASCA, Celiac/Food Allergy panels--neg, Ca19-9, CEA-wnl, CRP <0.1\par \par 7/29/20: : c/o acute LBP:s/pfall on buttocks ~ 2 wks ago\par Rx w Advil 200 Q6 CC, Duloxetine 20; L/S spine:acute/sub [id number] Fx\par 8/5/20 Pain Management: +Tizanidine 2 mg tid for spasm--didn’t take\par 8/13/20: was doing better on Miralax 1 cap qd & Benefiber 2 gummies q am , until fall, then more constipated: BM: # 1-2, small w strain,1q am, then feels like she has to go, but nothing,\par  +INC evac bloat, wt=90\par * Abd pain—epigastric: less but an emptiness which decr w eating ~ 11am/4 pm \par \par Saw Dr. Cooley--felt not to have PD, but evidence of " mini strokes", to see Dr Bennett and to get\par carotid study and  repeat MRI\par Neuropsych eval reportedly w ? attention deficit\par 11/12/20: on Miralax 1/8  cap qd & Benefiber 2 gummies q am / 1 w lunch\par Wt=up to 101lbs( gained 11lbs in 3 months) \par BMs: more formed, less urgency, but has a fear of SBO if holds back BM, so just releases it willingly in adult diaper--> advise that obstruction is unlikely from marco a the anal sphincter and that actually will worsen sphincter control by atrophy of non-use\par epigastric pain : less but an emptiness which decr w eating;  BMs: # 5, 3-4x/d , less bloat\par \par Today: Feeling better, no c/o , CP, SOB/ BECERRA, Cough, Wheeze, Palpitations, edema\par   Most recent labs  and imaging reviewed w patient:lipids/lfts--wnl \par \par Recently developed weston-anal irritation for Incr stooling, LBMs, + Incontinence\par Saw Derm, given Ketoconazole cream, sitz bathes w Vingear\par Also Miralax cut to 1/8 tsp q am\par Stools forming alittle, skin less irritated but still burning\par \par Wt =104 \par * Abd pain—epigastric: less but an emptiness which decr w eating ~ 11am/4 pm \par Hypogastric--no\par LUQ/Flank--no\par LLQ--yes feel like gas, pre BM \par * Nausea—no\par * Vomit—no\par * Belching—no\par * Regurgitation—no\par * Ht burn—no\par * Throat Clearing—no\par * Globus—no\par * Cough—no\par * Hoarseness—no\par * Dysphagia—no\par * BMs: # 5, 4x/D\par * Constipation—better\par * Diarrhea—resolving w decr miralax\par * Bloating—less\par * Flatulence—no\par * Gurgling—no\par * Melena—no\par * BPBPR—no\par * Anorexia—no\par * Wt. Loss-no\par \par \par

## 2021-02-18 NOTE — REVIEW OF SYSTEMS
[Chills] : no chills [Red Eyes] : eyes not red [Heart Rate Is Fast] : the heart rate was not fast [Dysuria] : no dysuria [Skin Lesions] : no skin lesions [Confused] : no confusion [Suicidal] : not suicidal [Proptosis] : no proptosis [Easy Bruising] : no tendency for easy bruising

## 2021-02-19 ENCOUNTER — APPOINTMENT (OUTPATIENT)
Dept: INTERNAL MEDICINE | Facility: CLINIC | Age: 76
End: 2021-02-19

## 2021-03-20 ENCOUNTER — NON-APPOINTMENT (OUTPATIENT)
Age: 76
End: 2021-03-20

## 2021-03-23 ENCOUNTER — APPOINTMENT (OUTPATIENT)
Dept: GASTROENTEROLOGY | Facility: CLINIC | Age: 76
End: 2021-03-23
Payer: MEDICARE

## 2021-03-23 VITALS
WEIGHT: 102 LBS | TEMPERATURE: 98.1 F | DIASTOLIC BLOOD PRESSURE: 78 MMHG | BODY MASS INDEX: 18.77 KG/M2 | HEART RATE: 88 BPM | SYSTOLIC BLOOD PRESSURE: 124 MMHG | HEIGHT: 62 IN

## 2021-03-23 PROCEDURE — 99215 OFFICE O/P EST HI 40 MIN: CPT

## 2021-03-23 NOTE — ASSESSMENT
[FreeTextEntry1] : Abdominal Pain: epigastric--better, but still present \par                           L-sided\par assoc w constipation/bloat; dyspepsia--less\par \par Recently w Diarrhea: LBMs, stool frequency & Grecia-anal dermatitis--persists\par \par wt loss--had  avoidance 2/2 constipation/ Dulox--decr appetite: better on Remeron \par mild anemia\par PE w reducible incisional hernia\par \par --s/p R. colon resection 7/2000 for Colon Ca\par s/p sbo x 2\par CT Abd/Pelv w po C: diverticulosis--6/2020\par 12/4/18 SBFT: wnl\par \par Perhaps some  low grade partial obstructive physiology w hernia/adhesive dis \par \par DDX: SIBO w MCTD, s/p RHC\par          Malabsorption\par          Colonic Neoplasia\par          IBD --unlikely w normal CRP/IBD panel                                                                                                                            Functional Bowel D/O--more likely   \par          GERD/Gastritis                                                                                                          \par \par      \par \par P: diet is LR, lactose free, low fat\par will need to transition diet to have provide some bulking  agent: Benefiber 2 tsp w breakfast / 2 w lunch\par anti-reflux diet\par adequate fluid \par reg exercise\par \par check stool for fecal fat/calprotectin --not returned \par     Senakot 2 qhs try to taper\par     MOM 15-30cc q afternoon --held 2/2 cramping/inc stool freq\par     Miralax 1 cap--> 1/2 BID-->1/4 BID--> 1/8 Tsp  BID --> 1/8 tsp q am \par     Benefiber Gummies 2 q am/ 2 w lunch\par     D/C Cymbalta 20--constipating \par    Remeron 7.5mg q hs \par    ? additonal neuromodulation w potentials : / Buspar 2.5 bid/ FDgard/ Creon / rotating ABX\par     Omep 20 q am & Pepcid pre dinner \par     Florastor 1 qd \par     Trial IB ghada 1 q am \par     Local care w Medicated powder: light dusting, followed by Ketocon/HC cream , clinton, then vaseline TID\par     No sitz bathes w vinegar, pat dry\par     Abd binder --slowly increase usage to decondition\par \par    EGD\par   Colonoscopy\par   \par \par surgical consult for hernia repair at tertiary center\par \par \par \par 2. Diverticulosis:  well – controlled.  No pain,  infection,  bleeding\par * Discussed the potential complications of perforation,  pain,  infection,  bleeding \par * currently Low – Fiber Diet was reviewed and emphasized\par * 6  --  8 cups of decaffeinated fluid daily\par \par \par \par \par \par \par 3. Hemorrhoids:  well – controlled.  No pain,  swelling,  itch,  bleeding\par * Discussed the potential complications of thrombosis,  pain,  infection,  swelling, itching,  bleeding \par * currently Low  – Fiber Diet was reviewed and emphasized\par * 6  --  8 cups of decaffeinated fluid daily\par * Sitz Bathes BID,  No:  Anusol HC  Suppos / Cream  NV BID\par * No:  Tucks BID,   No:  Balneol Lotion,   No:  Calmoseptine Oint,   ++ Prep H prn\par * No:  Colorectal surgical evaluation for possible ablation \par \par \par \par \par 4. Grecia Anal Dermatitis: Recently w Diarrhea: LBMs, stool frequency:  slow to improve 2/2 laxative use \par     \par     Benefiber Gummies 2 q am/ 2 w lunch/ 0\par     Miralax dropped to 1/8 tsp q am \par     Try to taper senna and prune juice \par    Remeron 7.5mg q hs \par     Florastor 1 qd \par     Trial IB ghada 1 q am \par     Local care w Medicated powder: light dusting, followed by Ketocon/HC cream , clinton, then vaseline TID\par     No sitz bathes w vinegar, pat dry\par \par \par \par \par Informed Consent:\par * The risks & Benefits ofwere discussed w patient.\par * This included but was not limited to perforation, bleeding, sedation /med rxns possibly requiring surgery, blood transfusions, antibiotics & CPR/Intubation.\par * Pt. understands & agrees to the procedures.\par * Pt. advised to D/C  ASA/NSAIDs  7  Days \par * [ +++ ]  Dulcolax / Miralax / Mag. Citrate ,  [     ] Prepopik/ Clenpiq ,  [     ] Osmo Prep,  [    ] GoLytely,  prep. reviewed w Pt.\par * Hold  [           ] AM of procedure.\par * Hold  [           ] PM of procedure.\par * Take  [           ] PM of procedure.\par * Take  [           ] AM of procedure.\par \par

## 2021-03-23 NOTE — HISTORY OF PRESENT ILLNESS
[de-identified] : \par This HPI reflects a summary and review of records : including previous and most recent  Labs, body imaging, consults and progress notes, operative and pathology reports, EKG reports, ED records, found in [facility], [facility] and/or [facility]\par \par \par \par PCP: Josh\par \par 76 yo F h/o Anxiety, HTN, HLD, Anemia/ neutropenia\par h/o Colon Ca ~ 3.5cm from ICV: s/p R colectomy 7/2000 + 5FU and Leucovorin\par had routine f/u imaging and colonoscopy\par 10/26/18-11/5/18 s/p sbo p/w abd pain, N, Bilious vomiting\par CT w po/IVC: transition pt w/i midline lower abdomen/superior pelvis w adjacent distended non-contrast fluid-filled sb loops extending into pelvis and demonstrating mucosal enhancement w surrounding ascites and mesenteric edema. small midline ventral abdominal  hernia into which a sht segment of mid-TV colon protrudes thru. \par 11/30-- 12/2/19 s/p Adm for sbo; p/w abd pain, N/V, diarrhea; CT w po/IVC: sbo w transition pt w/i pelvis\par 3/5/19: ra=770, usu 120\par 9/27/19: wt=94, prn donnatal for cramping\par 1/17/20: wt=90\par 2/20/20 Dr. foto: for wt loss, rash 2 mo prior on hands, had seen derm told of +BEN and ? 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 moreno: epigastric burn, Inc Gas on Pepcid 20mg--> omep 20mg qd\par 5/19/20: Dr. Moreno: occas upper GI distress, always feels hungry, + constipation on Senakot\par Labs: WBC=2.6, Hgb=11.8 , BUN=23, creat=0.5\par \par 6/17/20 CT ABD/PEL w po C: neg\par \par Init CC 6/30/20:  long h/o constipation x years, worsened after SBO's--> went to [facility], also lost a lot wt after each hospitalization, never able to gain it back. wt=93\par over last 1-2 mo c/o epigastric/hypogastric pain: ache--> Left upper quad and flank\par relieved by passing gas\par BMs: #4 q am, then feels like she has to go, but nothing, occas loose/mushy BM later\par + Bloat, no melena, brbpr\par + epigastric burn/ht burn, + belch\par No anorexia, or early satiety\par 7/21/20: no real change, MOM 30cc--> Incr cramping and stool frequency, gas in Left colon which\par released w BMs: # 4 q am, then feels like she has to go, but nothing, occas loose/mushy BM later\par Abd binder felt restrictive--too much , + Bloat \par Wt=94, epigastric: less but an emptiness which decr w eating ~ 11am/4 pm \par Labs:ANCA/ASCA, Celiac/Food Allergy panels--neg, Ca19-9, CEA-wnl, CRP <0.1\par \par 7/29/20: : c/o acute LBP:s/pfall on buttocks ~ 2 wks ago\par Rx w Advil 200 Q6 CC, Duloxetine 20; L/S spine:acute/sub [id number] Fx\par 8/5/20 Pain Management: +Tizanidine 2 mg tid for spasm--didn’t take\par 8/13/20: was doing better on Miralax 1 cap qd & Benefiber 2 gummies q am , until fall, then more constipated: BM: # 1-2, small w strain,1q am, then feels like she has to go, but nothing,\par  +INC evac bloat, wt=90\par * Abd pain—epigastric: less but an emptiness which decr w eating ~ 11am/4 pm \par \par Saw Dr. Cooley--felt not to have PD, but evidence of " mini strokes", to see Dr Bennett and to get\par carotid study and  repeat MRI\par Neuropsych eval reportedly w ? attention deficit\par 11/12/20: on Miralax 1/8  cap qd & Benefiber 2 gummies q am / 1 w lunch\par Wt=up to 101lbs( gained 11lbs in 3 months) \par BMs: more formed, less urgency, but has a fear of SBO if holds back BM, so just releases it willingly in adult diaper--> advise that obstruction is unlikely from marco a the anal sphincter and that actually will worsen sphincter control by atrophy of non-use\par epigastric pain : less but an emptiness which decr w eating;  BMs: # 5, 3-4x/d , less bloat\par \par 2/18/21: Recently developed weston-anal irritation for Incr stooling, LBMs, + Incontinence\par Saw Derm, given Ketoconazole cream, sitz bathes w Vingear\par Also Miralax cut to 1/8 tsp q am, Benefier gummies 2/1/0\par  Trial IB ghada 1 q am \par  Local care w Med powder: light dusting,followed by Ketocon/HC crm , clinton, then vaseline TID\par Stools forming alittle, skin less irritated but still burning, less bloat, some Inc evac\par Intermittent L-sided cramping\par kg=044, BMs : 6, 4x/D--> #5-6, 4x/D\par \par Today: no c/o , CP, SOB/ BECERRA, Cough, Wheeze, Palpitations, edema\par   Most recent labs  and imaging reviewed w patient:lipids/lfts--wnl \par \par \par Wt =102 \par * Abd pain—epigastric: less but an emptiness which decr w eating ~ 11am/4 pm \par Hypogastric--no\par LUQ/Flank--no\par LLQ--yes feel like gas, pre BM \par * Nausea—no\par * Vomit—no\par * Belching—no\par * Regurgitation—no\par * Ht burn—no\par * Throat Clearing—no\par * Globus—no\par * Cough—no\par * Hoarseness—no\par * Dysphagia—no\par * BMs: # 5, then # 7 x 2, taking prune juice bid and sennakot \par * Constipation—better\par * Inc Evac--yes \par * Diarrhea—usually latter in the day x 2\par * Bloating—less\par * Flatulence—no\par * Gurgling—no\par * Melena—no\par * BPBPR—no\par * Anorexia—no\par * Wt. Loss-no\par \par \par

## 2021-05-06 ENCOUNTER — RX RENEWAL (OUTPATIENT)
Age: 76
End: 2021-05-06

## 2021-05-11 ENCOUNTER — APPOINTMENT (OUTPATIENT)
Dept: RHEUMATOLOGY | Facility: CLINIC | Age: 76
End: 2021-05-11
Payer: MEDICARE

## 2021-05-11 VITALS
WEIGHT: 104 LBS | TEMPERATURE: 98.7 F | HEIGHT: 62 IN | SYSTOLIC BLOOD PRESSURE: 128 MMHG | DIASTOLIC BLOOD PRESSURE: 70 MMHG | BODY MASS INDEX: 19.14 KG/M2

## 2021-05-11 DIAGNOSIS — M35.1 OTHER OVERLAP SYNDROMES: ICD-10-CM

## 2021-05-11 PROCEDURE — 99214 OFFICE O/P EST MOD 30 MIN: CPT

## 2021-05-11 NOTE — REVIEW OF SYSTEMS
[Constipation] : constipation [Skin Lesions] : skin lesion [Fever] : no fever [Chills] : no chills [Eye Pain] : no eye pain [Red Eyes] : eyes not red [Shortness Of Breath] : no shortness of breath [Cough] : no cough [SOB on Exertion] : no shortness of breath during exertion [Abdominal Pain] : no abdominal pain [Diarrhea] : no diarrhea [Dysuria] : no dysuria [Joint Pain] : no joint pain [Joint Swelling] : no joint swelling [Joint Stiffness] : no joint stiffness

## 2021-05-11 NOTE — HISTORY OF PRESENT ILLNESS
[FreeTextEntry1] : Patient comes in for f/u and reports progression of rash from feet at the last visit, to her entire body, with red, pruritic blotches.  No progression of clinical, systemic symptoms related to MCTD - denies fever, respiratory, cardiac or joint symptoms.  She reports that she never went for kyphoplasty last year after her visit here, and that her back pain has essentially resolved except for some pain in the evening hours after increased activity. She reports that she saw dermatology (Dr. Recinos) and he Rx'ed "a white pill" for her rash, but does not know the name of the pill.

## 2021-05-11 NOTE — PHYSICAL EXAM
[General Appearance - Alert] : alert [General Appearance - In No Acute Distress] : in no acute distress [General Appearance - Well Developed] : well developed [Motor Exam] : the motor exam was normal [FreeTextEntry1] : There are diffuse, erythematous blotches of her extremities and torso.

## 2021-05-17 ENCOUNTER — NON-APPOINTMENT (OUTPATIENT)
Age: 76
End: 2021-05-17

## 2021-05-18 ENCOUNTER — APPOINTMENT (OUTPATIENT)
Dept: GASTROENTEROLOGY | Facility: CLINIC | Age: 76
End: 2021-05-18
Payer: MEDICARE

## 2021-05-18 VITALS
TEMPERATURE: 98.2 F | DIASTOLIC BLOOD PRESSURE: 70 MMHG | SYSTOLIC BLOOD PRESSURE: 120 MMHG | WEIGHT: 104 LBS | BODY MASS INDEX: 19.14 KG/M2 | HEIGHT: 62 IN | HEART RATE: 80 BPM

## 2021-05-18 PROCEDURE — 99215 OFFICE O/P EST HI 40 MIN: CPT

## 2021-05-18 NOTE — ASSESSMENT
[FreeTextEntry1] : Abdominal Pain: epigastric--better, but still present usu when hungry\par                           L-sided--resolved\par assoc w constipation/bloat; dyspepsia--less\par \par Recently w Diarrhea: LBMs, stool frequency & Grecia-anal dermatitis--> better\par \par wt loss--had  avoidance 2/2 constipation/ Dulox--decr appetite: better on Remeron \par mild anemia\par PE w reducible incisional hernia\par \par --s/p R. colon resection 7/2000 for Colon Ca\par s/p sbo x 2\par CT Abd/Pelv w po C: diverticulosis--6/2020\par 12/4/18 SBFT: wnl\par \par Perhaps some  low grade partial obstructive physiology w hernia/adhesive dis \par \par DDX: SIBO w MCTD, s/p RHC\par          Bile-induced diarrhea\par          Malabsorption\par          Colonic Neoplasia\par          IBD --unlikely w normal CRP/IBD panel                                                                                                                            Functional Bowel D/O--more likely   \par          GERD/Gastritis                                                                                                          \par \par      \par \par P: diet is LR, lactose free, low fat\par will need to transition diet to have provide some bulking  agent: Benefiber 2 gummies tid CC\par anti-reflux diet\par adequate fluid \par reg exercise\par \par check stool for fecal fat/calprotectin --not returned \par     Senakot  1 after L & 2 after D try to taper\par     MOM 15-30cc q afternoon --held 2/2 cramping/inc stool freq\par     Miralax 1 cap--> 1/2 BID-->1/4 BID--> 1/8 Tsp  BID --> 1/8 tsp q am \par     Prune Juice 1/4 cup qd w water prn but only once daily\par     Benefiber Gummies 2 q am/ 2 w lunch/ 2 w Dinner \par     D/C Cymbalta 20--constipating \par    Remeron 7.5mg q hs \par    ? additonal neuromodulation w potentials : / Buspar 2.5 bid/ FDgard/ Creon / rotating ABX\par     Omep 20 q am & Pepcid pre dinner \par     Florastor 1 qd--> yes\par     Trial IB ghada 1 q am -->no\par     Local care w Medicated powder: light dusting, followed by Ketocon/HC cream , clinton, then vaseline TID\par     No sitz bathes w vinegar, pat dry\par     Abd binder --slowly increase usage to decondition\par \par    EGD--on hold\par   Colonoscopy--on hold \par   \par \par surgical consult for hernia repair at tertiary center\par \par \par \par 2. Diverticulosis:  well – controlled.  No pain,  infection,  bleeding\par * Discussed the potential complications of perforation,  pain,  infection,  bleeding \par * currently Low – Fiber Diet was reviewed and emphasized\par * 6  --  8 cups of decaffeinated fluid daily\par \par \par \par \par \par \par 3. Hemorrhoids:  well – controlled.  No pain,  swelling,  itch,  bleeding\par * Discussed the potential complications of thrombosis,  pain,  infection,  swelling, itching,  bleeding \par * currently Low  – Fiber Diet was reviewed and emphasized\par * 6  --  8 cups of decaffeinated fluid daily\par * Sitz Bathes BID,  No:  Anusol HC  Suppos / Cream  SC BID\par * No:  Tucks BID,   No:  Balneol Lotion,   No:  Calmoseptine Oint,   ++ Prep H prn\par * No:  Colorectal surgical evaluation for possible ablation \par \par \par \par \par 4. Grecia Anal Dermatitis: Recently w Diarrhea: LBMs, stool frequency:  slow to improve 2/2 laxative use \par     \par     Benefiber Gummies 2 q am/ 2 w lunch/ 2 w dinner \par     Miralax dropped to 1/8 tsp q am \par     Try to taper senna and prune juice \par    Remeron 7.5mg q hs \par     Florastor 1 qd \par     Trial IB ghada on hold \par     Local care w Medicated powder: light dusting, followed by Ketocon/HC cream , clinton, then vaseline TID\par     No sitz bathes w vinegar, pat dry\par \par \par \par \par Informed Consent:\par * The risks & Benefits of were discussed w patient.\par * This included but was not limited to perforation, bleeding, sedation /med rxns possibly requiring surgery, blood transfusions, antibiotics & CPR/Intubation.\par * Pt. understands & agrees to the procedures.\par * Pt. advised to D/C  ASA/NSAIDs  7  Days \par * [ +++ ]  Dulcolax / Miralax / Mag. Citrate ,  [     ] Prepopik/ Clenpiq ,  [     ] Osmo Prep,  [    ] GoLytely,  prep. reviewed w Pt.\par * Hold  [           ] AM of procedure.\par * Hold  [           ] PM of procedure.\par * Take  [           ] PM of procedure.\par * Take  [           ] AM of procedure.\par \par

## 2021-05-18 NOTE — HISTORY OF PRESENT ILLNESS
[de-identified] : \par This HPI reflects a summary and review of records : including previous and most recent  Labs, body imaging, consults and progress notes, operative and pathology reports, EKG reports, ED records, found in [facility], [facility] and/or [facility]\par \par \par PCP: Josh\par \par 76 yo F h/o Anxiety, HTN, HLD, Anemia/ neutropenia\par h/o Colon Ca ~ 3.5cm from ICV: s/p R colectomy 7/2000 + 5FU and Leucovorin\par had routine f/u imaging and colonoscopy\par 10/26/18-11/5/18 s/p sbo p/w abd pain, N, Bilious vomiting\par CT w po/IVC: transition pt w/i midline lower abdomen/superior pelvis w adjacent distended non-contrast fluid-filled sb loops extending into pelvis and demonstrating mucosal enhancement w surrounding ascites and mesenteric edema. small midline ventral abdominal  hernia into which a sht segment of mid-TV colon protrudes thru. \par 11/30-- 12/2/19 s/p Adm for sbo; p/w abd pain, N/V, diarrhea; CT w po/IVC: sbo w transition pt w/i pelvis\par 3/5/19: ec=348, usu 120\par 9/27/19: wt=94, prn donnatal for cramping\par 1/17/20: wt=90\par 2/20/20 Dr. foto: for wt loss, rash 2 mo prior on hands, had seen derm told of +BEN and ? 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 moreno: epigastric burn, Inc Gas on Pepcid 20mg--> omep 20mg qd\par 5/19/20: Dr. Moreno: occas upper GI distress, always feels hungry, + constipation on Senakot\par Labs: WBC=2.6, Hgb=11.8 , BUN=23, creat=0.5\par \par 6/17/20 CT ABD/PEL w po C: neg\par \par Init CC 6/30/20:  long h/o constipation x years, worsened after SBO's--> went to [facility], also lost a lot wt after each hospitalization, never able to gain it back. wt=93\par over last 1-2 mo c/o epigastric/hypogastric pain: ache--> Left upper quad and flank\par relieved by passing gas\par BMs: #4 q am, then feels like she has to go, but nothing, occas loose/mushy BM later\par + Bloat, no melena, brbpr\par + epigastric burn/ht burn, + belch\par No anorexia, or early satiety\par 7/21/20: no real change, MOM 30cc--> Incr cramping and stool frequency, gas in Left colon which\par released w BMs: # 4 q am, then feels like she has to go, but nothing, occas loose/mushy BM later\par Abd binder felt restrictive--too much , + Bloat \par Wt=94, epigastric: less but an emptiness which decr w eating ~ 11am/4 pm \par Labs:ANCA/ASCA, Celiac/Food Allergy panels--neg, Ca19-9, CEA-wnl, CRP <0.1\par \par 7/29/20: : c/o acute LBP:s/pfall on buttocks ~ 2 wks ago\par Rx w Advil 200 Q6 CC, Duloxetine 20; L/S spine:acute/sub [id number] Fx\par 8/5/20 Pain Management: +Tizanidine 2 mg tid for spasm--didn’t take\par 8/13/20: was doing better on Miralax 1 cap qd & Benefiber 2 gummies q am , until fall, then more constipated: BM: # 1-2, small w strain,1q am, then feels like she has to go, but nothing,\par  +INC evac bloat, wt=90\par * Abd pain—epigastric: less but an emptiness which decr w eating ~ 11am/4 pm \par \par Saw Dr. Cooley--felt not to have PD, but evidence of " mini strokes", to see Dr Bennett and to get\par carotid study and  repeat MRI\par Neuropsych eval reportedly w ? attention deficit\par 11/12/20: on Miralax 1/8  cap qd & Benefiber 2 gummies q am / 1 w lunch\par Wt=up to 101lbs( gained 11lbs in 3 months) \par BMs: more formed, less urgency, but has a fear of SBO if holds back BM, so just releases it willingly in adult diaper--> advise that obstruction is unlikely from marco a the anal sphincter and that actually will worsen sphincter control by atrophy of non-use\par epigastric pain : less but an emptiness which decr w eating;  BMs: # 5, 3-4x/d , less bloat\par \par 2/18/21: Recently developed weston-anal irritation for Incr stooling, LBMs, + Incontinence\par Saw Derm, given Ketoconazole cream, sitz bathes w Vingear\par Also Miralax cut to 1/8 tsp q am, Benefier gummies 2/1/0\par  Trial IB ghada 1 q am \par  Local care w Med powder: light dusting,followed by Ketocon/HC crm , clinton, then vaseline TID\par Stools forming alittle, skin less irritated but still burning, less bloat, some Inc evac\par Intermittent L-sided cramping\par if=616, BMs : 6, 4x/D--> #5-6, 4x/D\par 3/23/21: af=430,  BMs: # 5, then # 7 x 2, taking prune juice bid and sennakot \par                + INC evac, less bloat\par                Rx w Miralax 1/8 cap qd, Benefiber 2 w B & 2 w L; Try to taper Sennakot\par \par Today: no c/o , CP, SOB/ BECERRA, Cough, Wheeze, Palpitations, edema\par   Most recent labs  and imaging reviewed w patient:lipids/lfts--wnl \par \par \par Wt =104 up 2 lbs,  dealing w rash from MCTD on Hydroxy, back better but arms itchy\par using steroid cream but no Anti-histamines\par \par * Abd pain—epigastric: less but an emptiness which decr w eating ~ 11am/4 pm \par Hypogastric--no\par LUQ/Flank--no\par LLQ--yes feel like gas, pre BM --occas\par * Nausea—no\par * Vomit—no\par * Belching—no\par * Regurgitation—no\par * Ht burn—no\par * Throat Clearing—no\par * Globus—no\par * Cough—no\par * Hoarseness—no\par * Dysphagia—no\par * BMs: #  usu #5-6 1-3x/d, w urges, occas # 2\par * Constipation—better\par * Inc Evac--yes\par * Diarrhea—better \par * Bloating—less\par * Flatulence—yes\par * Gurgling—no\par * Melena—no\par * BPBPR—no\par * Anorexia—no\par * Wt. Loss-no\par \par \par

## 2021-05-19 ENCOUNTER — APPOINTMENT (OUTPATIENT)
Dept: CARDIOLOGY | Facility: CLINIC | Age: 76
End: 2021-05-19
Payer: MEDICARE

## 2021-05-19 ENCOUNTER — NON-APPOINTMENT (OUTPATIENT)
Age: 76
End: 2021-05-19

## 2021-05-19 VITALS
HEART RATE: 78 BPM | BODY MASS INDEX: 18.95 KG/M2 | HEIGHT: 62 IN | WEIGHT: 103 LBS | TEMPERATURE: 98.6 F | SYSTOLIC BLOOD PRESSURE: 130 MMHG | DIASTOLIC BLOOD PRESSURE: 70 MMHG

## 2021-05-19 DIAGNOSIS — M72.2 PLANTAR FASCIAL FIBROMATOSIS: ICD-10-CM

## 2021-05-19 PROCEDURE — 99214 OFFICE O/P EST MOD 30 MIN: CPT

## 2021-05-19 PROCEDURE — 93000 ELECTROCARDIOGRAM COMPLETE: CPT

## 2021-05-19 RX ORDER — CLOBETASOL PROPIONATE 0.5 MG/ML
0.05 SOLUTION TOPICAL
Qty: 30 | Refills: 0 | Status: DISCONTINUED | COMMUNITY
Start: 2019-09-26 | End: 2021-05-19

## 2021-05-19 RX ORDER — TIZANIDINE 2 MG/1
2 TABLET ORAL
Qty: 45 | Refills: 0 | Status: DISCONTINUED | COMMUNITY
Start: 2020-08-05 | End: 2021-05-19

## 2021-05-19 RX ORDER — OLMESARTAN MEDOXOMIL 40 MG/1
40 TABLET, FILM COATED ORAL
Qty: 90 | Refills: 3 | Status: DISCONTINUED | COMMUNITY
Start: 2019-02-21 | End: 2021-05-19

## 2021-05-19 NOTE — CARDIOLOGY SUMMARY
[___] : [unfilled] [de-identified] : 5/19/2021 [de-identified] : ze 11/2020- rare ectopy [de-identified] : 12/2020 ef 70% pasp 30-35mmhg [de-identified] : 12/2020 mild calcified focal plaque lica

## 2021-07-31 ENCOUNTER — NON-APPOINTMENT (OUTPATIENT)
Age: 76
End: 2021-07-31

## 2021-08-03 ENCOUNTER — APPOINTMENT (OUTPATIENT)
Dept: GASTROENTEROLOGY | Facility: CLINIC | Age: 76
End: 2021-08-03
Payer: MEDICARE

## 2021-08-03 VITALS
WEIGHT: 102 LBS | OXYGEN SATURATION: 96 % | DIASTOLIC BLOOD PRESSURE: 68 MMHG | BODY MASS INDEX: 18.77 KG/M2 | HEART RATE: 72 BPM | SYSTOLIC BLOOD PRESSURE: 134 MMHG | TEMPERATURE: 98 F | HEIGHT: 62 IN

## 2021-08-03 PROCEDURE — 99215 OFFICE O/P EST HI 40 MIN: CPT

## 2021-08-03 NOTE — ASSESSMENT
[FreeTextEntry1] : Abdominal Pain: epigastric--better, but still present usu when hungry\par                           L-sided--mostly resolved\par assoc w constipation/bloat; dyspepsia--less\par \par Recently w Diarrhea: LBMs, stool frequency & Grecia-anal dermatitis--> better\par \par wt loss--had  avoidance 2/2 constipation/ Dulox--decr appetite: better on Remeron \par mild anemia\par PE w reducible incisional hernia\par \par --s/p R. colon resection 7/2000 for Colon Ca\par s/p sbo x 2\par CT Abd/Pelv w po C: diverticulosis--6/2020\par 12/4/18 SBFT: wnl\par \par Perhaps some  low grade partial obstructive physiology w hernia/adhesive dis \par \par DDX: SIBO w MCTD, s/p RHC\par          Bile-induced diarrhea\par          Malabsorption\par          Colonic Neoplasia\par          IBD --unlikely w normal CRP/IBD panel                                                                                                                            Functional Bowel D/O--more likely   \par          GERD/Gastritis                                                                                                          \par \par      \par \par P: diet is LR, lactose free, low fat , need protein: Ensure 1 qd--- at least\par will need to transition diet to have provide some bulking  agent: Benefiber 2 gummies tid CC\par anti-reflux diet\par adequate fluid \par reg exercise\par \par check stool for fecal fat/calprotectin --not returned \par     Senakot  1 after L & 1 after D try to taper\par     MOM 15-30cc q afternoon --held 2/2 cramping/inc stool freq\par     Miralax 1 cap--> 1/2 BID-->1/4 BID--> 1/8 Tsp  BID --> 1/8 tsp q am \par     Prune Juice 1/4 cup qd w water prn but only once daily\par     Benefiber Gummies 2 q am/ 2 w lunch/ 2 w Dinner \par     D/C Cymbalta 20--constipating \par    Remeron 7.5mg q hs \par    ? additonal neuromodulation w potentials : / Buspar 2.5 bid/ FDgard/ Creon / rotating ABX\par     Omep 20 q am & Pepcid pre lunch and dinner \par     Florastor 1 qd--> yes\par     Trial IB ghada 1 q am -->no\par     Local care w Medicated powder: light dusting, followed by Ketocon/HC cream , clinton, then vaseline TID\par     No sitz bathes w vinegar, pat dry\par     Abd binder --slowly increase usage to decondition\par \par    EGD--on hold\par   Colonoscopy--on hold \par   \par \par surgical consult for hernia repair at tertiary center\par \par \par \par 2. Diverticulosis:  well - controlled.  No pain,  infection,  bleeding\par Recommendations\par * Discussed and reviewed the potential complications of perforation,  pain,  infection,  bleeding \par *  currently Low  –Fiber Diet was reviewed and emphasized\par * Daily fluid intake was reviewed : 6  --  8 cups of decaffeinated fluid daily was emphasized \par \par \par \par \par \par \par \par \par 3. Hemorrhoids:  well - controlled.  No pain,  swelling,  itch,  bleeding\par * Discussed the potential complications of thrombosis,  pain,  infection,  swelling, itching,  bleeding \par Recommendations: \par *  currently Low- Fiber Diet was reviewed and emphasized\par * 6  --  8 cups of decaffeinated fluid daily was emphasized \par * Sitz Bathes BID,  No:  Anusol HC  Suppos / Cream  MS BID -- was needed\par * No:  Tucks BID,  Balneol Lotion,   Calmoseptine Oint -- was needed ;    can use  Prep H prn\par * No:  need for  Colorectal surgical evaluation for possible ablation w Dr --  was emphasized\par \par \par  \par \par \par \par \par 4. Grecia Anal Dermatitis: Recently w Diarrhea: LBMs, stool frequency:  slow to improve 2/2 laxative use \par     \par     Benefiber Gummies 2 q am/ 2 w lunch/ 2 w dinner \par     Miralax dropped to 1/8 tsp q am \par     Try to taper senna and prune juice \par    Remeron 7.5mg q hs \par     Florastor 1 qd \par     Trial IB ghada on hold \par     Local care w Medicated powder: light dusting, followed by Ketocon/HC cream , clinton, then vaseline TID\par     No sitz bathes w vinegar, pat dry\par \par \par \par \par \par

## 2021-08-03 NOTE — HISTORY OF PRESENT ILLNESS
[de-identified] : \par This HPI reflects a summary and review of records : including previous and most recent  Labs, body imaging, consults and progress notes, operative and pathology reports, EKG reports, ED records, found in [facility], [facility] and/or [facility]\par \par \par PCP: Josh\par \par 74 yo F h/o Anxiety, HTN, HLD, Anemia/ neutropenia\par h/o Colon Ca ~ 3.5cm from ICV: s/p R colectomy 7/2000 + 5FU and Leucovorin\par had routine f/u imaging and colonoscopy\par 10/26/18-11/5/18 s/p sbo p/w abd pain, N, Bilious vomiting\par CT w po/IVC: transition pt w/i midline lower abdomen/superior pelvis w adjacent distended non-contrast fluid-filled sb loops extending into pelvis and demonstrating mucosal enhancement w surrounding ascites and mesenteric edema. small midline ventral abdominal  hernia into which a sht segment of mid-TV colon protrudes thru. \par 11/30-- 12/2/19 s/p Adm for sbo; p/w abd pain, N/V, diarrhea; CT w po/IVC: sbo w transition pt w/i pelvis\par 3/5/19: rw=623, usu 120\par 9/27/19: wt=94, prn donnatal for cramping\par 1/17/20: wt=90\par 2/20/20 Dr. foto: for wt loss, rash 2 mo prior on hands, had seen derm told of +BEN and ? 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 moreno: epigastric burn, Inc Gas on Pepcid 20mg--> omep 20mg qd\par 5/19/20: Dr. Moreno: occas upper GI distress, always feels hungry, + constipation on Senakot\par Labs: WBC=2.6, Hgb=11.8 , BUN=23, creat=0.5\par \par 6/17/20 CT ABD/PEL w po C: neg\par \par Init CC 6/30/20:  long h/o constipation x years, worsened after SBO's--> went to [facility], also lost a lot wt after each hospitalization, never able to gain it back. wt=93\par over last 1-2 mo c/o epigastric/hypogastric pain: ache--> Left upper quad and flank\par relieved by passing gas\par BMs: #4 q am, then feels like she has to go, but nothing, occas loose/mushy BM later\par + Bloat, no melena, brbpr\par + epigastric burn/ht burn, + belch\par No anorexia, or early satiety\par 7/21/20: no real change, MOM 30cc--> Incr cramping and stool frequency, gas in Left colon which\par released w BMs: # 4 q am, then feels like she has to go, but nothing, occas loose/mushy BM later\par Abd binder felt restrictive--too much , + Bloat \par Wt=94, epigastric: less but an emptiness which decr w eating ~ 11am/4 pm \par Labs:ANCA/ASCA, Celiac/Food Allergy panels--neg, Ca19-9, CEA-wnl, CRP <0.1\par \par 7/29/20: : c/o acute LBP:s/pfall on buttocks ~ 2 wks ago\par Rx w Advil 200 Q6 CC, Duloxetine 20; L/S spine:acute/sub [id number] Fx\par 8/5/20 Pain Management: +Tizanidine 2 mg tid for spasm--didn’t take\par 8/13/20: was doing better on Miralax 1 cap qd & Benefiber 2 gummies q am , until fall, then more constipated: BM: # 1-2, small w strain,1q am, then feels like she has to go, but nothing,\par  +INC evac bloat, wt=90\par * Abd pain—epigastric: less but an emptiness which decr w eating ~ 11am/4 pm \par \par Saw Dr. Cooley--felt not to have PD, but evidence of " mini strokes", to see Dr Bennett and to get\par carotid study and  repeat MRI\par Neuropsych eval reportedly w ? attention deficit\par 11/12/20: on Miralax 1/8  cap qd & Benefiber 2 gummies q am / 1 w lunch\par Wt=up to 101lbs( gained 11lbs in 3 months) \par BMs: more formed, less urgency, but has a fear of SBO if holds back BM, so just releases it willingly in adult diaper--> advise that obstruction is unlikely from marco a the anal sphincter and that actually will worsen sphincter control by atrophy of non-use\par epigastric pain : less but an emptiness which decr w eating;  BMs: # 5, 3-4x/d , less bloat\par \par 2/18/21: Recently developed weston-anal irritation for Incr stooling, LBMs, + Incontinence\par Saw Derm, given Ketoconazole cream, sitz bathes w Vingear\par Also Miralax cut to 1/8 tsp q am, Benefier gummies 2/1/0\par  Trial IB ghada 1 q am \par  Local care w Med powder: light dusting,followed by Ketocon/HC crm , clinton, then vaseline TID\par Stools forming alittle, skin less irritated but still burning, less bloat, some Inc evac\par Intermittent L-sided cramping\par ld=823, BMs : 6, 4x/D--> #5-6, 4x/D\par 3/23/21: qj=020,  BMs: # 5, then # 7 x 2, taking prune juice bid and sennakot \par                + INC evac, less bloat\par                Rx w Miralax 1/8 cap qd, Benefiber 2 w B & 2 w L; Try to taper Sennakot\par \par 5/18/21:  Wt =104 up 2 lbs,  dealing w rash from MCTD on Hydroxy, back better but arms itchy\par               using steroid cream but no Anti-histamines\par                less  epigastric pain, occas LLQ pain,  BMs:  usu #5-6 1-3x/d, w urges, occas # 2\par                less  Constipation, diarrhea, bloat, + Flatulance\par \par Today: no c/o , CP, SOB/ BECERRA, Cough, Wheeze, Palpitations, edema\par   Most recent labs  and imaging reviewed w patient:lipids/lfts--wnl \par \par nl=523, picking at foods again,  trying to push protein, but pt lost her taste for fish\par rec: chopped liver, tuna, rolled up CC\par \par * Abd pain—epigastric: less but an emptiness which decr w eating ~ 11am/4 pm \par Hypogastric--no\par LUQ/Flank--no\par LLQ--yes feel like gas, pre BM --yes\par * Nausea—no\par * Vomit—no\par * Belching—no\par * Regurgitation—no\par * Ht burn—no\par * Throat Clearing—no\par * Globus—no\par * Cough—no\par * Hoarseness—no\par * Dysphagia—no\par * BMs:  usu #5-6 , 3-5 x/d, w urges, recently more  # 2\par * Constipation—yes\par * Inc Evac--yes\par * Diarrhea—intermittent \par * Bloating—occas \par * Flatulence—less\par * Gurgling—no\par * Melena—no\par * BPBPR—no\par * Anorexia—no\par * Wt. Loss-no\par \par \par

## 2021-08-08 ENCOUNTER — RX RENEWAL (OUTPATIENT)
Age: 76
End: 2021-08-08

## 2021-08-12 ENCOUNTER — APPOINTMENT (OUTPATIENT)
Dept: RHEUMATOLOGY | Facility: CLINIC | Age: 76
End: 2021-08-12

## 2021-09-09 ENCOUNTER — RX RENEWAL (OUTPATIENT)
Age: 76
End: 2021-09-09

## 2021-09-21 ENCOUNTER — APPOINTMENT (OUTPATIENT)
Dept: INTERNAL MEDICINE | Facility: CLINIC | Age: 76
End: 2021-09-21
Payer: MEDICARE

## 2021-09-21 VITALS
TEMPERATURE: 97.2 F | HEIGHT: 62 IN | WEIGHT: 97 LBS | BODY MASS INDEX: 17.85 KG/M2 | DIASTOLIC BLOOD PRESSURE: 80 MMHG | SYSTOLIC BLOOD PRESSURE: 110 MMHG | OXYGEN SATURATION: 98 % | HEART RATE: 77 BPM

## 2021-09-21 PROCEDURE — 99213 OFFICE O/P EST LOW 20 MIN: CPT | Mod: 25

## 2021-09-21 PROCEDURE — 90662 IIV NO PRSV INCREASED AG IM: CPT

## 2021-09-21 PROCEDURE — G0008: CPT

## 2021-09-21 NOTE — ASSESSMENT
[FreeTextEntry1] : Patient remains anxious about her GI status. She remains underweight. I will increase her Remeron to 15 mg h.s. She is instructed to eat more and and not worry about possible development of small bowel obstruction. I would like to see patient again in the office in 2 months. High dose flu vaccine has been given.

## 2021-09-21 NOTE — PHYSICAL EXAM
[No Acute Distress] : no acute distress [Well Developed] : well developed [Well-Appearing] : well-appearing [Normal Sclera/Conjunctiva] : normal sclera/conjunctiva [PERRL] : pupils equal round and reactive to light [EOMI] : extraocular movements intact [Normal Outer Ear/Nose] : the outer ears and nose were normal in appearance [Normal Oropharynx] : the oropharynx was normal [No JVD] : no jugular venous distention [No Lymphadenopathy] : no lymphadenopathy [Supple] : supple [Thyroid Normal, No Nodules] : the thyroid was normal and there were no nodules present [No Respiratory Distress] : no respiratory distress  [No Accessory Muscle Use] : no accessory muscle use [Clear to Auscultation] : lungs were clear to auscultation bilaterally [Normal Rate] : normal rate  [Regular Rhythm] : with a regular rhythm [Normal S1, S2] : normal S1 and S2 [No Murmur] : no murmur heard [No Carotid Bruits] : no carotid bruits [No Abdominal Bruit] : a ~M bruit was not heard ~T in the abdomen [No Varicosities] : no varicosities [Pedal Pulses Present] : the pedal pulses are present [No Edema] : there was no peripheral edema [No Palpable Aorta] : no palpable aorta [No Extremity Clubbing/Cyanosis] : no extremity clubbing/cyanosis [Soft] : abdomen soft [Non Tender] : non-tender [Non-distended] : non-distended [No Masses] : no abdominal mass palpated [No HSM] : no HSM [Normal Bowel Sounds] : normal bowel sounds [Normal Posterior Cervical Nodes] : no posterior cervical lymphadenopathy [Normal Anterior Cervical Nodes] : no anterior cervical lymphadenopathy [No CVA Tenderness] : no CVA  tenderness [No Spinal Tenderness] : no spinal tenderness [No Joint Swelling] : no joint swelling [Grossly Normal Strength/Tone] : grossly normal strength/tone [No Rash] : no rash [Coordination Grossly Intact] : coordination grossly intact [No Focal Deficits] : no focal deficits [Normal Gait] : normal gait [Normal Affect] : the affect was normal [Normal Insight/Judgement] : insight and judgment were intact [de-identified] : underweight

## 2021-09-21 NOTE — HISTORY OF PRESENT ILLNESS
[FreeTextEntry1] : Followup on her weight status. [de-identified] : Patient states her weight is stable at 102. Call for her weight here today is about 98. She continues to worry about what she is going to eat that might cause blockage. She eats cheese, ice cream and ravioli. However she eats very small amounts. She frequently has gas pain in the left upper quadrant. She takes MiraLax and Senokot. She has about 3 bowel movements a day small amounts. He should remains on Remeron 7.5 mg h.s.

## 2021-10-29 ENCOUNTER — APPOINTMENT (OUTPATIENT)
Dept: INTERNAL MEDICINE | Facility: CLINIC | Age: 76
End: 2021-10-29
Payer: MEDICARE

## 2021-10-29 VITALS
HEART RATE: 76 BPM | WEIGHT: 97 LBS | DIASTOLIC BLOOD PRESSURE: 80 MMHG | HEIGHT: 62 IN | OXYGEN SATURATION: 98 % | TEMPERATURE: 97.2 F | SYSTOLIC BLOOD PRESSURE: 130 MMHG | BODY MASS INDEX: 17.85 KG/M2

## 2021-10-29 PROCEDURE — 99212 OFFICE O/P EST SF 10 MIN: CPT

## 2021-10-29 NOTE — PHYSICAL EXAM
[No Acute Distress] : no acute distress [Well Nourished] : well nourished [Well Developed] : well developed [Well-Appearing] : well-appearing [Normal Sclera/Conjunctiva] : normal sclera/conjunctiva [PERRL] : pupils equal round and reactive to light [EOMI] : extraocular movements intact [Normal Outer Ear/Nose] : the outer ears and nose were normal in appearance [Normal Oropharynx] : the oropharynx was normal [No JVD] : no jugular venous distention [No Lymphadenopathy] : no lymphadenopathy [Supple] : supple [Thyroid Normal, No Nodules] : the thyroid was normal and there were no nodules present [No Respiratory Distress] : no respiratory distress  [No Accessory Muscle Use] : no accessory muscle use [Clear to Auscultation] : lungs were clear to auscultation bilaterally [Normal Rate] : normal rate  [Regular Rhythm] : with a regular rhythm [Normal S1, S2] : normal S1 and S2 [No Murmur] : no murmur heard [No Carotid Bruits] : no carotid bruits [No Abdominal Bruit] : a ~M bruit was not heard ~T in the abdomen [No Varicosities] : no varicosities [Pedal Pulses Present] : the pedal pulses are present [No Edema] : there was no peripheral edema [No Palpable Aorta] : no palpable aorta [No Extremity Clubbing/Cyanosis] : no extremity clubbing/cyanosis [Soft] : abdomen soft [Non Tender] : non-tender [Non-distended] : non-distended [No Masses] : no abdominal mass palpated [No HSM] : no HSM [Normal Bowel Sounds] : normal bowel sounds [Normal Posterior Cervical Nodes] : no posterior cervical lymphadenopathy [Normal Anterior Cervical Nodes] : no anterior cervical lymphadenopathy [No CVA Tenderness] : no CVA  tenderness [No Spinal Tenderness] : no spinal tenderness [No Joint Swelling] : no joint swelling [Grossly Normal Strength/Tone] : grossly normal strength/tone [No Rash] : no rash [Coordination Grossly Intact] : coordination grossly intact [No Focal Deficits] : no focal deficits [Normal Gait] : normal gait [Deep Tendon Reflexes (DTR)] : deep tendon reflexes were 2+ and symmetric [Normal Affect] : the affect was normal [Normal Insight/Judgement] : insight and judgment were intact [de-identified] : mild erythema at base on nail 3rd finger r hand

## 2021-10-29 NOTE — HISTORY OF PRESENT ILLNESS
[FreeTextEntry1] : Possible infection of the finger [de-identified] : Patient states for the past few days she noticed redness at the base of the canal on the third finger of the right hand. The area is not painful. She is concerned whether she has an infection. This patient carries a diagnosis of mixed connective tissue disease. I suspect she has Raynaud's syndrome. She does have some slight redness at the base of the nailbed it is not tender to touch.

## 2021-10-29 NOTE — ASSESSMENT
[FreeTextEntry1] : I do not believe patient has an infection of the finger. She appears to have possible raynauds, which may contribute to the discoloration of the fingernails. Patient is advised to monitor.

## 2021-11-07 ENCOUNTER — NON-APPOINTMENT (OUTPATIENT)
Age: 76
End: 2021-11-07

## 2021-11-08 ENCOUNTER — RX RENEWAL (OUTPATIENT)
Age: 76
End: 2021-11-08

## 2021-11-09 ENCOUNTER — APPOINTMENT (OUTPATIENT)
Dept: GASTROENTEROLOGY | Facility: CLINIC | Age: 76
End: 2021-11-09
Payer: MEDICARE

## 2021-11-09 VITALS
OXYGEN SATURATION: 98 % | WEIGHT: 96 LBS | HEIGHT: 62 IN | SYSTOLIC BLOOD PRESSURE: 128 MMHG | DIASTOLIC BLOOD PRESSURE: 80 MMHG | BODY MASS INDEX: 17.66 KG/M2 | HEART RATE: 77 BPM

## 2021-11-09 PROCEDURE — 99215 OFFICE O/P EST HI 40 MIN: CPT

## 2021-11-09 NOTE — HISTORY OF PRESENT ILLNESS
[de-identified] : \par This HPI reflects a summary and review of records : including previous and most recent  Labs, body imaging, consults and progress notes, operative and pathology reports, EKG reports, ED records, found in [facility], [facility] and/or [facility]\par \par \par PCP: Josh\par \par 77 yo F h/o Anxiety, HTN, HLD, Anemia/ neutropenia\par h/o Colon Ca ~ 3.5cm from ICV: s/p R colectomy 7/2000 + 5FU and Leucovorin\par had routine f/u imaging and colonoscopy\par 10/26/18-11/5/18 s/p sbo p/w abd pain, N, Bilious vomiting\par CT w po/IVC: transition pt w/i midline lower abdomen/superior pelvis w adjacent distended non-contrast fluid-filled sb loops extending into pelvis and demonstrating mucosal enhancement w surrounding ascites and mesenteric edema. small midline ventral abdominal  hernia into which a sht segment of mid-TV colon protrudes thru. \par 11/30-- 12/2/19 s/p Adm for sbo; p/w abd pain, N/V, diarrhea; CT w po/IVC: sbo w transition pt w/i pelvis\par 3/5/19: cw=309, usu 120\par 9/27/19: wt=94, prn donnatal for cramping\par 1/17/20: wt=90\par 2/20/20 Dr. foto: for wt loss, rash 2 mo prior on hands, had seen derm told of +BEN and ? 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 moreno: epigastric burn, Inc Gas on Pepcid 20mg--> omep 20mg qd\par 5/19/20: Dr. Moreno: occas upper GI distress, always feels hungry, + constipation on Senakot\par Labs: WBC=2.6, Hgb=11.8 , BUN=23, creat=0.5\par \par 6/17/20 CT ABD/PEL w po C: neg\par \par Init CC 6/30/20:  long h/o constipation x years, worsened after SBO's--> went to [facility], also lost a lot wt after each hospitalization, never able to gain it back. wt=93\par over last 1-2 mo c/o epigastric/hypogastric pain: ache--> Left upper quad and flank\par relieved by passing gas\par BMs: #4 q am, then feels like she has to go, but nothing, occas loose/mushy BM later\par + Bloat, no melena, brbpr\par + epigastric burn/ht burn, + belch\par No anorexia, or early satiety\par 7/21/20: no real change, MOM 30cc--> Incr cramping and stool frequency, gas in Left colon which\par released w BMs: # 4 q am, then feels like she has to go, but nothing, occas loose/mushy BM later\par Abd binder felt restrictive--too much , + Bloat \par Wt=94, epigastric: less but an emptiness which decr w eating ~ 11am/4 pm \par Labs:ANCA/ASCA, Celiac/Food Allergy panels--neg, Ca19-9, CEA-wnl, CRP <0.1\par \par 7/29/20: : c/o acute LBP:s/pfall on buttocks ~ 2 wks ago\par Rx w Advil 200 Q6 CC, Duloxetine 20; L/S spine:acute/sub [id number] Fx\par 8/5/20 Pain Management: +Tizanidine 2 mg tid for spasm--didn’t take\par 8/13/20: was doing better on Miralax 1 cap qd & Benefiber 2 gummies q am , until fall, then more constipated: BM: # 1-2, small w strain,1q am, then feels like she has to go, but nothing,\par  +INC evac bloat, wt=90\par * Abd pain—epigastric: less but an emptiness which decr w eating ~ 11am/4 pm \par \par Saw Dr. Cooley--felt not to have PD, but evidence of " mini strokes", to see Dr Bennett and to get\par carotid study and  repeat MRI\par Neuropsych eval reportedly w ? attention deficit\par 11/12/20: on Miralax 1/8  cap qd & Benefiber 2 gummies q am / 1 w lunch\par Wt=up to 101lbs( gained 11lbs in 3 months) \par BMs: more formed, less urgency, but has a fear of SBO if holds back BM, so just releases it willingly in adult diaper--> advise that obstruction is unlikely from marco a the anal sphincter and that actually will worsen sphincter control by atrophy of non-use\par epigastric pain : less but an emptiness which decr w eating;  BMs: # 5, 3-4x/d , less bloat\par \par 2/18/21: Recently developed weston-anal irritation for Incr stooling, LBMs, + Incontinence\par Saw Derm, given Ketoconazole cream, sitz bathes w Vingear\par Also Miralax cut to 1/8 tsp q am, Benefier gummies 2/1/0\par  Trial IB ghada 1 q am \par  Local care w Med powder: light dusting,followed by Ketocon/HC crm , clinton, then vaseline TID\par Stools forming alittle, skin less irritated but still burning, less bloat, some Inc evac\par Intermittent L-sided cramping\par ns=016, BMs : 6, 4x/D--> #5-6, 4x/D\par 3/23/21: lk=575,  BMs: # 5, then # 7 x 2, taking prune juice bid and sennakot \par                + INC evac, less bloat\par                Rx w Miralax 1/8 cap qd, Benefiber 2 w B & 2 w L; Try to taper Sennakot\par \par 5/18/21:  Wt =104 up 2 lbs,  dealing w rash from MCTD on Hydroxy, back better but arms itchy\par               using steroid cream but no Anti-histamines\par                less  epigastric pain, occas LLQ pain,  BMs:  usu #5-6 1-3x/d, w urges, occas # 2\par                less  Constipation, diarrhea, bloat, + Flatulance\par 8/3/21: th=264, picking at foods again,  trying to push protein, but pt lost her taste for             fish;  rec: chopped liver, tuna, rolled up CC\par \par 11/9/21: Today: no c/o , CP, SOB/ BECERRA, Cough, Wheeze, Palpitations, edema\par                Most recent labs  and imaging reviewed w patient:lipids/lfts--wnl \par \par \par 11/9/21: wt = 97, eating well, having 203 ensure qd, walking daily \par \par * Abd pain—> epigastric: less but an emptiness which decr w eating ~ 11am/4 pm \par Hypogastric--no\par LUQ/Flank--no\par LLQ-->  feels like gas, pre BM --> yes\par * Nausea—no\par * Vomit—no\par * Belching—no\par * Regurgitation—no\par * Ht burn—no\par * Throat Clearing—no\par * Globus—no\par * Cough—no\par * Hoarseness—no\par * Dysphagia—no\par * BMs:  usu #3-4,  3-5 x/d,\par * Constipation—> bettr\par * Inc Evac-> occas \par * Diarrhea—intermittent :  better\par * Bloating—occas \par * Flatulence—l>yes \par * Gurgling—no\par * Melena—no\par * BPBPR—no\par * Anorexia—no\par * Wt. Loss-no\par \par \par

## 2021-11-09 NOTE — ASSESSMENT
[FreeTextEntry1] : 1. Abdominal Pain: epigastric--> better, but still present usu when hungry\par                           L-sided--mostly resolved\par assoc w constipation/bloat; dyspepsia--> less\par \par Recently w Diarrhea: LBMs, stool frequency & Grecia-anal dermatitis--> better\par \par wt loss--had  avoidance 2/2 constipation/ Dulox-->decr appetite: better on Remeron \par mild anemia\par PE w reducible incisional hernia\par \par --s/p R. colon resection 7/2000 for Colon Ca\par s/p sbo x 2\par CT Abd/Pelv w po C: diverticulosis--6/2020\par 12/4/18 SBFT: wnl\par \par Perhaps some  low grade partial obstructive physiology w hernia/adhesive dis \par \par DDX: SIBO w MCTD, s/p RHC\par          Bile-induced diarrhea\par          Malabsorption\par          Colonic Neoplasia\par          IBD --unlikely w normal CRP/IBD panel                                                                                                                            Functional Bowel D/O-->more likely   \par          GERD/Gastritis                                                                                                          \par \par      \par \par P: diet is LR, lactose free, low fat , need protein: Ensure 1 qd--- at least\par will need to transition diet to have provide some bulking  agent: Benefiber 2 gummies 2-3 x /D CC\par anti-reflux diet\par adequate fluid \par reg exercise\par \par check stool for fecal fat/calprotectin -->not returned \par     Senakot  1 after L & 1 after D try to taper\par     MOM 15-30cc q afternoon --held 2/2 cramping/inc stool freq\par     Miralax 1 cap--> 1/2 BID-->1/4 BID--> 1/8 Tsp  BID --> 1/8 tsp q am \par     Prune Juice 1/4 cup qd w water prn but only once daily\par     Benefiber Gummies  2 w lunch/ 2 w Dinner \par     D/C Cymbalta 20-->constipating \par    Remeron 7.5mg q hs \par    ? additonal neuromodulation w potentials : / Buspar 2.5 bid/ FDgard/ Creon / rotating ABX\par     Omep 20 q am & Pepcid pre lunch and dinner \par     Florastor 1 qd or other 2 BID \par     Trial IB ghada 1 q am -->no\par     Local care w Medicated powder: light dusting, followed by Ketocon/HC cream , clinton, then vaseline TID\par     No sitz bathes w vinegar, pat dry\par     Abd binder --slowly increase usage to decondition\par \par    EGD--on hold\par   Colonoscopy--on hold \par   \par \par surgical consult for hernia repair at tertiary center\par \par \par \par 2. Diverticulosis:  well - controlled.  No pain,  infection,  bleeding\par Recommendations\par * Discussed  previously\par the potential complications of perforation,  pain,  infection,  bleeding \par *  currently Low  –Fiber Diet was  emphasized\par * Daily fluid intake was reviewed : 6  --  8 cups of decaffeinated fluid daily was emphasized \par \par \par \par \par \par \par \par \par 3. Hemorrhoids:  well - controlled.  No pain,  swelling,  itch,  bleeding\par * Discussed  previously\par the potential complications of thrombosis,  pain,  infection,  swelling, itching,  bleeding \par Recommendations: \par *  currently Low- Fiber Diet was emphasized\par * 6  --  8 cups of decaffeinated fluid daily was emphasized \par * Sitz Bathes BID prn, Anusol HC  Suppos / Cream  DC BID -- was needed\par * No:  Tucks BID,  Balneol Lotion,   Calmoseptine Oint -- was needed ;    can use  Prep H prn\par * No:  need for  Colorectal surgical evaluation for possible ablation\par \par \par  \par \par \par \par \par 4. Grecia Anal Dermatitis: Recently w Diarrhea: LBMs, stool frequency: better \par     \par     Benefiber Gummies 2 w lunch/ 2 w dinner \par     Miralax dropped to 1/8 tsp q am \par     Try to taper senna and prune juice \par    Remeron 7.5mg q hs \par     Florastor 1 qd or other probiotics 2 BID \par     Trial IB ghada on hold \par     Local care w Medicated powder: light dusting, followed by Ketocon/HC cream , clinton, then vaseline TID\par     No sitz bathes w vinegar, pat dry\par \par \par \par \par \par

## 2021-12-14 ENCOUNTER — APPOINTMENT (OUTPATIENT)
Dept: INTERNAL MEDICINE | Facility: CLINIC | Age: 76
End: 2021-12-14
Payer: MEDICARE

## 2021-12-14 VITALS
SYSTOLIC BLOOD PRESSURE: 110 MMHG | HEART RATE: 85 BPM | DIASTOLIC BLOOD PRESSURE: 60 MMHG | OXYGEN SATURATION: 99 % | TEMPERATURE: 96.7 F

## 2021-12-14 PROCEDURE — 99214 OFFICE O/P EST MOD 30 MIN: CPT | Mod: 25

## 2021-12-14 PROCEDURE — 36415 COLL VENOUS BLD VENIPUNCTURE: CPT

## 2021-12-14 NOTE — PHYSICAL EXAM
[No Acute Distress] : no acute distress [Well Developed] : well developed [Well-Appearing] : well-appearing [Normal Sclera/Conjunctiva] : normal sclera/conjunctiva [PERRL] : pupils equal round and reactive to light [EOMI] : extraocular movements intact [Normal Outer Ear/Nose] : the outer ears and nose were normal in appearance [Normal Oropharynx] : the oropharynx was normal [No JVD] : no jugular venous distention [No Lymphadenopathy] : no lymphadenopathy [Supple] : supple [Thyroid Normal, No Nodules] : the thyroid was normal and there were no nodules present [No Respiratory Distress] : no respiratory distress  [No Accessory Muscle Use] : no accessory muscle use [Clear to Auscultation] : lungs were clear to auscultation bilaterally [Normal Rate] : normal rate  [Regular Rhythm] : with a regular rhythm [Normal S1, S2] : normal S1 and S2 [No Murmur] : no murmur heard [No Carotid Bruits] : no carotid bruits [No Abdominal Bruit] : a ~M bruit was not heard ~T in the abdomen [No Varicosities] : no varicosities [Pedal Pulses Present] : the pedal pulses are present [No Edema] : there was no peripheral edema [No Palpable Aorta] : no palpable aorta [No Extremity Clubbing/Cyanosis] : no extremity clubbing/cyanosis [Soft] : abdomen soft [Non Tender] : non-tender [Non-distended] : non-distended [No Masses] : no abdominal mass palpated [No HSM] : no HSM [Normal Bowel Sounds] : normal bowel sounds [Normal Posterior Cervical Nodes] : no posterior cervical lymphadenopathy [Normal Anterior Cervical Nodes] : no anterior cervical lymphadenopathy [No CVA Tenderness] : no CVA  tenderness [No Spinal Tenderness] : no spinal tenderness [No Joint Swelling] : no joint swelling [Grossly Normal Strength/Tone] : grossly normal strength/tone [No Rash] : no rash [Coordination Grossly Intact] : coordination grossly intact [No Focal Deficits] : no focal deficits [Normal Affect] : the affect was normal [Normal Insight/Judgement] : insight and judgment were intact [de-identified] : underweight [de-identified] : mild erythema at base on nail 3rd finger r hand [de-identified] : small steps shuffling gait with walker

## 2021-12-14 NOTE — REVIEW OF SYSTEMS
[Abdominal Pain] : abdominal pain [Constipation] : constipation [Unsteady Walking] : ataxia [Negative] : Heme/Lymph

## 2021-12-14 NOTE — ASSESSMENT
[FreeTextEntry1] : Patient has lost about 3-4 pounds. Her blood pressure is 110/60. We'll reduce Benicar to 20 mg daily. Patient is advised to see a neurologist regarding her gait abnormality. A physical therapy consult for gait abnormality has been ordered. Routine labs have been drawn. Patient is to call me for results.

## 2021-12-14 NOTE — HISTORY OF PRESENT ILLNESS
[FreeTextEntry1] : Followup 3 mos [de-identified] : Patient has lost a few pounds and now weighs 91 pounds. She continues to express anxiety over the possibility that she is going to have an intestinal blockage. She is seeing GI. She takes MiraLax for constipation. She wants physical therapy for her gait. She has a shuffling small stepped gait. She says she previously was seen by neurology and was told she did not have Parkinson's. Her blood pressure currently 110/60 on Benicar 40 mg daily. She has not had recent labs. She eats a very regimented diet but continues to lose weight. She denies chest pain, shortness of breath or cough.

## 2021-12-16 LAB
25(OH)D3 SERPL-MCNC: 41.7 NG/ML
ALBUMIN SERPL ELPH-MCNC: 4.6 G/DL
ALP BLD-CCNC: 96 U/L
ALT SERPL-CCNC: 20 U/L
ANION GAP SERPL CALC-SCNC: 15 MMOL/L
AST SERPL-CCNC: 30 U/L
BASOPHILS # BLD AUTO: 0.01 K/UL
BASOPHILS NFR BLD AUTO: 0.3 %
BILIRUB SERPL-MCNC: 0.4 MG/DL
BUN SERPL-MCNC: 30 MG/DL
CALCIUM SERPL-MCNC: 10.2 MG/DL
CHLORIDE SERPL-SCNC: 103 MMOL/L
CHOLEST SERPL-MCNC: 132 MG/DL
CO2 SERPL-SCNC: 24 MMOL/L
CREAT SERPL-MCNC: 0.9 MG/DL
EOSINOPHIL # BLD AUTO: 0.02 K/UL
EOSINOPHIL NFR BLD AUTO: 0.6 %
ESTIMATED AVERAGE GLUCOSE: 114 MG/DL
GLUCOSE SERPL-MCNC: 83 MG/DL
HBA1C MFR BLD HPLC: 5.6 %
HCT VFR BLD CALC: 35.8 %
HDLC SERPL-MCNC: 74 MG/DL
HGB BLD-MCNC: 11.1 G/DL
IMM GRANULOCYTES NFR BLD AUTO: 0.3 %
LDLC SERPL CALC-MCNC: 47 MG/DL
LYMPHOCYTES # BLD AUTO: 1.03 K/UL
LYMPHOCYTES NFR BLD AUTO: 28.9 %
MAN DIFF?: NORMAL
MCHC RBC-ENTMCNC: 31 GM/DL
MCHC RBC-ENTMCNC: 31.4 PG
MCV RBC AUTO: 101.4 FL
MONOCYTES # BLD AUTO: 0.28 K/UL
MONOCYTES NFR BLD AUTO: 7.8 %
NEUTROPHILS # BLD AUTO: 2.22 K/UL
NEUTROPHILS NFR BLD AUTO: 62.1 %
NONHDLC SERPL-MCNC: 57 MG/DL
PLATELET # BLD AUTO: 217 K/UL
POTASSIUM SERPL-SCNC: 4.1 MMOL/L
PROT SERPL-MCNC: 7.2 G/DL
RBC # BLD: 3.53 M/UL
RBC # FLD: 12.7 %
SODIUM SERPL-SCNC: 142 MMOL/L
TRIGL SERPL-MCNC: 51 MG/DL
TSH SERPL-ACNC: 1.28 UIU/ML
VIT B12 SERPL-MCNC: 705 PG/ML
WBC # FLD AUTO: 3.57 K/UL

## 2022-01-25 ENCOUNTER — NON-APPOINTMENT (OUTPATIENT)
Age: 77
End: 2022-01-25

## 2022-02-01 ENCOUNTER — APPOINTMENT (OUTPATIENT)
Dept: GASTROENTEROLOGY | Facility: CLINIC | Age: 77
End: 2022-02-01
Payer: MEDICARE

## 2022-02-01 VITALS
HEART RATE: 88 BPM | SYSTOLIC BLOOD PRESSURE: 96 MMHG | HEIGHT: 62 IN | WEIGHT: 96 LBS | DIASTOLIC BLOOD PRESSURE: 62 MMHG | BODY MASS INDEX: 17.66 KG/M2

## 2022-02-01 PROCEDURE — 99214 OFFICE O/P EST MOD 30 MIN: CPT

## 2022-02-01 NOTE — ASSESSMENT
[FreeTextEntry1] : 1. Abdominal Pain: epigastric--> better, but still present usu when hungry\par                           L-sided--mostly resolved\par assoc w constipation/bloat; dyspepsia--> less\par \par Recently w Diarrhea: LBMs, stool frequency & Grecia-anal dermatitis--> better\par \par wt loss--had  avoidance 2/2 constipation/ Dulox-->decr appetite: better on Remeron \par mild anemia\par PE w reducible incisional hernia\par \par --s/p R. colon resection 7/2000 for Colon Ca\par s/p sbo x 2\par CT Abd/Pelv w po C: diverticulosis--6/2020\par 12/4/18 SBFT: wnl\par \par Perhaps some  low grade partial obstructive physiology w hernia/adhesive dis \par \par DDX: SIBO w MCTD, s/p RHC\par          Bile-induced diarrhea\par          Malabsorption\par          Colonic Neoplasia\par          IBD --unlikely w normal CRP/IBD panel                                                                                                                            Functional Bowel D/O-->more likely   \par          GERD/Gastritis                                                                                                          \par \par      \par \par P: diet is LR, lactose free, low fat , need protein: Ensure 1 qd--- at least\par will need to transition diet to have provide some bulking  agent: Benefiber 2 gummies 2-3 x /D CC\par anti-reflux diet\par adequate fluid \par reg exercise\par \par check stool for fecal fat/calprotectin -->not returned \par     Senakot  1 qd \par     MOM 15-30cc q afternoon --held 2/2 cramping/inc stool freq\par     Miralax 1 cap--> 1/2 BID-->1/4 BID--> 1/8 Tsp  BID --> 1/8 tsp q am \par     Prune Juice 1/4 cup qd w water prn but only once daily\par     Benefiber Gummies  2 w Breakfast, 1 w lunch/ 2 w Dinner \par     D/C Cymbalta 20-->constipating \par    Remeron 7.5mg q hs \par    ? additonal neuromodulation w potentials : / Buspar 2.5 bid/ FDgard/ Creon / rotating ABX\par     Omep 20 q am & Pepcid pre lunch and dinner \par     Florastor 1 qd or other 2 BID \par     Trial IB ghada 1 q am -->no\par     Local care w Medicated powder: light dusting, followed by Ketocon/HC cream , calmoseptine, then vaseline TID\par     No sitz bathes w vinegar, pat dry\par     Abd binder --slowly increase usage to decondition\par \par    EGD--on hold\par   Colonoscopy--on hold \par   \par \par surgical consult for hernia repair at tertiary center\par \par \par \par 2. Diverticulosis:  well - controlled.  No pain,  infection,  bleeding\par Recommendations\par * Discussed  previously\par the potential complications of perforation,  pain,  infection,  bleeding \par *  currently Low  –Fiber Diet was  emphasized\par * Daily fluid intake was reviewed : 6  --  8 cups of decaffeinated fluid daily was emphasized \par \par \par \par \par \par 3. Hemorrhoids:  well - controlled.  No pain,  swelling,  itch,  bleeding\par * Discussed  previously\par the potential complications of thrombosis,  pain,  infection,  swelling, itching,  bleeding \par Recommendations: \par *  currently Low- Fiber Diet was emphasized\par * 6  --  8 cups of decaffeinated fluid daily was emphasized \par * Sitz Bathes BID prn, Anusol HC  Suppos / Cream  MD BID -- was needed\par * No:  Tucks BID,  Balneol Lotion,   Calmoseptine Oint -- was needed ;    can use  Prep H prn\par * No:  need for  Colorectal surgical evaluation for possible ablation\par \par \par  \par \par \par \par 4. Grecia Anal Dermatitis: Recently w Diarrhea: LBMs, stool frequency: better \par     \par     Benefiber Gummies 2 w Breakfast, 1 w  lunch/ 2 w dinner \par     Miralax dropped to 1/8 tsp q am \par     Try to taper senna and prune juice \par    Remeron 7.5mg q hs \par     Florastor 1 qd or other probiotics 2 BID \par     Trial IB ghada on hold \par     Local care w Medicated powder: light dusting, followed by Ketocon/HC cream , calmoseptine, then vaseline TID\par     No sitz bathes w vinegar, pat dry\par \par \par \par \par \par

## 2022-02-01 NOTE — HISTORY OF PRESENT ILLNESS
[de-identified] : \par This HPI reflects a summary and review of records : including previous and most recent  Labs, body imaging, consults and progress notes, operative and pathology reports, EKG reports, ED records, found in [facility], [facility] and/or [facility]\par \par \par PCP: Josh\par \par 77 yo F h/o Anxiety, HTN, HLD, Anemia/ neutropenia\par h/o Colon Ca ~ 3.5cm from ICV: s/p R colectomy 7/2000 + 5FU and Leucovorin\par had routine f/u imaging and colonoscopy\par 10/26/18-11/5/18 s/p sbo p/w abd pain, N, Bilious vomiting\par CT w po/IVC: transition pt w/i midline lower abdomen/superior pelvis w adjacent distended non-contrast fluid-filled sb loops extending into pelvis and demonstrating mucosal enhancement w surrounding ascites and mesenteric edema. small midline ventral abdominal  hernia into which a sht segment of mid-TV colon protrudes thru. \par 11/30-- 12/2/19 s/p Adm for sbo; p/w abd pain, N/V, diarrhea; CT w po/IVC: sbo w transition pt w/i pelvis\par 3/5/19: ju=742, usu 120\par 9/27/19: wt=94, prn donnatal for cramping\par 1/17/20: wt=90\par 2/20/20 Dr. foto: for wt loss, rash 2 mo prior on hands, had seen derm told of +BEN and ? 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 moreno: epigastric burn, Inc Gas on Pepcid 20mg--> omep 20mg qd\par 5/19/20: Dr. Moreno: occas upper GI distress, always feels hungry, + constipation on Senakot\par Labs: WBC=2.6, Hgb=11.8 , BUN=23, creat=0.5\par \par 6/17/20 CT ABD/PEL w po C: neg\par \par Init CC 6/30/20:  long h/o constipation x years, worsened after SBO's--> went to [facility], also lost a lot wt after each hospitalization, never able to gain it back. wt=93\par over last 1-2 mo c/o epigastric/hypogastric pain: ache--> Left upper quad and flank\par relieved by passing gas\par BMs: #4 q am, then feels like she has to go, but nothing, occas loose/mushy BM later\par + Bloat, no melena, brbpr\par + epigastric burn/ht burn, + belch\par No anorexia, or early satiety\par 7/21/20: no real change, MOM 30cc--> Incr cramping and stool frequency, gas in Left colon which\par released w BMs: # 4 q am, then feels like she has to go, but nothing, occas loose/mushy BM later\par Abd binder felt restrictive--too much , + Bloat \par Wt=94, epigastric: less but an emptiness which decr w eating ~ 11am/4 pm \par Labs:ANCA/ASCA, Celiac/Food Allergy panels--neg, Ca19-9, CEA-wnl, CRP <0.1\par \par 7/29/20: : c/o acute LBP:s/pfall on buttocks ~ 2 wks ago\par Rx w Advil 200 Q6 CC, Duloxetine 20; L/S spine:acute/sub [id number] Fx\par 8/5/20 Pain Management: +Tizanidine 2 mg tid for spasm--didn’t take\par 8/13/20: was doing better on Miralax 1 cap qd & Benefiber 2 gummies q am , until fall, then more constipated: BM: # 1-2, small w strain,1q am, then feels like she has to go, but nothing,\par  +INC evac bloat, wt=90\par * Abd pain—epigastric: less but an emptiness which decr w eating ~ 11am/4 pm \par \par Saw Dr. Cooley--felt not to have PD, but evidence of " mini strokes", to see Dr Bennett and to get\par carotid study and  repeat MRI\par Neuropsych eval reportedly w ? attention deficit\par 11/12/20: on Miralax 1/8  cap qd & Benefiber 2 gummies q am / 1 w lunch\par Wt=up to 101lbs( gained 11lbs in 3 months) \par BMs: more formed, less urgency, but has a fear of SBO if holds back BM, so just releases it willingly in adult diaper--> advise that obstruction is unlikely from marco a the anal sphincter and that actually will worsen sphincter control by atrophy of non-use\par epigastric pain : less but an emptiness which decr w eating;  BMs: # 5, 3-4x/d , less bloat\par \par 2/18/21: Recently developed weston-anal irritation for Incr stooling, LBMs, + Incontinence\par Saw Derm, given Ketoconazole cream, sitz bathes w Vingear\par Also Miralax cut to 1/8 tsp q am, Benefier gummies 2/1/0\par  Trial IB ghada 1 q am \par  Local care w Med powder: light dusting,followed by Ketocon/HC crm , clinton, then vaseline TID\par Stools forming alittle, skin less irritated but still burning, less bloat, some Inc evac\par Intermittent L-sided cramping\par bw=113, BMs : 6, 4x/D--> #5-6, 4x/D\par 3/23/21: dj=290,  BMs: # 5, then # 7 x 2, taking prune juice bid and sennakot \par                + INC evac, less bloat\par                Rx w Miralax 1/8 cap qd, Benefiber 2 w B & 2 w L; Try to taper Sennakot\par \par 5/18/21:  Wt =104 up 2 lbs,  dealing w rash from MCTD on Hydroxy, back better but arms itchy\par               using steroid cream but no Anti-histamines\par                less  epigastric pain, occas LLQ pain,  BMs:  usu #5-6 1-3x/d, w urges, occas # 2\par                less  Constipation, diarrhea, bloat, + Flatulance\par 8/3/21: ck=109, picking at foods again,  trying to push protein, but pt lost her taste for             fish;  rec: chopped liver, tuna, rolled up CC\par 11/9/21: wt = 97, eating well, having 203 ensure qd, walking daily \par \par 2/1/22 : Today: no c/o , CP, SOB/ BECERRA, Cough, Wheeze, Palpitations, edema\par                Most recent labs reviewed w patient:12/2021 : cmet, HgbA1c,vit D, Lipids, b12,tsh,\par                 Hb=11, GTG=632, BUN=30\par \par 2/1/22:   doing well, diet is very limited, having 2 ensure, wt=91\par               Diet: B: egg, apple/grapes; crackers w peanut butter;\par                        L; grill cheese on White & apple, snack apple/grapes\par                       D: Ravioli/ butter, apple\par \par * Abd pain—> epigastric: less but an emptiness which decr w eating ~ 11am/4 pm \par Hypogastric--no\par LUQ/Flank--no\par LLQ-->  feels like gas, pre BM -->yes\par * Nausea—no\par * Vomit—no\par * Belching—no\par * Regurgitation—no\par * Ht burn—no\par * Throat Clearing—no\par * Globus—no\par * Cough—no\par * Hoarseness—no\par * Dysphagia—no\par * BMs:  usu #3-4,  3-5 x/d,\par * Constipation—> occas strain \par * Inc Evac-> yes\par * Diarrhea—intermittent :  better\par * Bloating--> occas \par * Flatulence—> yes\par * Gurgling—no\par * Melena—no\par * BPBPR—no\par * Anorexia—no\par * Wt. Loss-yes\par \par \par

## 2022-02-08 ENCOUNTER — APPOINTMENT (OUTPATIENT)
Dept: INTERNAL MEDICINE | Facility: CLINIC | Age: 77
End: 2022-02-08
Payer: MEDICARE

## 2022-02-08 VITALS
OXYGEN SATURATION: 98 % | DIASTOLIC BLOOD PRESSURE: 70 MMHG | WEIGHT: 92 LBS | HEIGHT: 62 IN | SYSTOLIC BLOOD PRESSURE: 120 MMHG | HEART RATE: 47 BPM | TEMPERATURE: 96.6 F | BODY MASS INDEX: 16.93 KG/M2

## 2022-02-08 PROCEDURE — 99214 OFFICE O/P EST MOD 30 MIN: CPT

## 2022-02-08 NOTE — PHYSICAL EXAM
[No Acute Distress] : no acute distress [Well Developed] : well developed [Well-Appearing] : well-appearing [Normal Sclera/Conjunctiva] : normal sclera/conjunctiva [PERRL] : pupils equal round and reactive to light [EOMI] : extraocular movements intact [Normal Outer Ear/Nose] : the outer ears and nose were normal in appearance [Normal Oropharynx] : the oropharynx was normal [No JVD] : no jugular venous distention [No Lymphadenopathy] : no lymphadenopathy [Supple] : supple [Thyroid Normal, No Nodules] : the thyroid was normal and there were no nodules present [No Respiratory Distress] : no respiratory distress  [No Accessory Muscle Use] : no accessory muscle use [Clear to Auscultation] : lungs were clear to auscultation bilaterally [Normal Rate] : normal rate  [Regular Rhythm] : with a regular rhythm [Normal S1, S2] : normal S1 and S2 [No Murmur] : no murmur heard [No Carotid Bruits] : no carotid bruits [No Abdominal Bruit] : a ~M bruit was not heard ~T in the abdomen [No Varicosities] : no varicosities [Pedal Pulses Present] : the pedal pulses are present [No Edema] : there was no peripheral edema [No Palpable Aorta] : no palpable aorta [No Extremity Clubbing/Cyanosis] : no extremity clubbing/cyanosis [Soft] : abdomen soft [Non Tender] : non-tender [Non-distended] : non-distended [No Masses] : no abdominal mass palpated [No HSM] : no HSM [Normal Bowel Sounds] : normal bowel sounds [Normal Posterior Cervical Nodes] : no posterior cervical lymphadenopathy [Normal Anterior Cervical Nodes] : no anterior cervical lymphadenopathy [No CVA Tenderness] : no CVA  tenderness [No Spinal Tenderness] : no spinal tenderness [No Joint Swelling] : no joint swelling [Grossly Normal Strength/Tone] : grossly normal strength/tone [No Rash] : no rash [Coordination Grossly Intact] : coordination grossly intact [No Focal Deficits] : no focal deficits [Normal Affect] : the affect was normal [Normal Insight/Judgement] : insight and judgment were intact [de-identified] : underweight [de-identified] : mild erythema at base on nail 3rd finger r hand [de-identified] : small steps shuffling gait with walker

## 2022-02-08 NOTE — REVIEW OF SYSTEMS
[Joint Stiffness] : joint stiffness [Muscle Weakness] : muscle weakness [Back Pain] : back pain [Skin Rash] : skin rash [Negative] : Heme/Lymph

## 2022-02-08 NOTE — ASSESSMENT
[FreeTextEntry1] : Patient with an episode of near syncope likely due to dehydration. I did not believe this patient actually lost consciousness. Current blood pressure 120/70. Patient is advised to drink more fluids. I will also give patient a referral to weight management program regarding the need to increase her diet.

## 2022-02-08 NOTE — HISTORY OF PRESENT ILLNESS
[FreeTextEntry1] : Evaluation for  near syncope [de-identified] : This morning patient developed lightheadedness and weakness. Her  was there and helped her to lie on the floor. She did not lose consciousness. She got up and felt better. There was no chest pain or shortness of breath. She feels very thirsty. This has never happened to her before. Her weight is down a couple pounds and now weighs 92 pounds. She remains very concerned about what she eats because she is afraid she will develop a blockage. She is on Benicar 20 mg daily Lipitor and blood pressure 120/70.

## 2022-02-18 ENCOUNTER — NON-APPOINTMENT (OUTPATIENT)
Age: 77
End: 2022-02-18

## 2022-04-19 ENCOUNTER — NON-APPOINTMENT (OUTPATIENT)
Age: 77
End: 2022-04-19

## 2022-04-19 DIAGNOSIS — L30.9 DERMATITIS, UNSPECIFIED: ICD-10-CM

## 2022-04-27 ENCOUNTER — APPOINTMENT (OUTPATIENT)
Dept: INTERNAL MEDICINE | Facility: CLINIC | Age: 77
End: 2022-04-27
Payer: MEDICARE

## 2022-04-27 VITALS
SYSTOLIC BLOOD PRESSURE: 110 MMHG | HEIGHT: 62 IN | OXYGEN SATURATION: 99 % | TEMPERATURE: 96.8 F | DIASTOLIC BLOOD PRESSURE: 60 MMHG | HEART RATE: 80 BPM | BODY MASS INDEX: 17.3 KG/M2 | WEIGHT: 94 LBS

## 2022-04-27 PROCEDURE — 36415 COLL VENOUS BLD VENIPUNCTURE: CPT

## 2022-04-27 PROCEDURE — 99214 OFFICE O/P EST MOD 30 MIN: CPT | Mod: 25

## 2022-04-27 NOTE — PHYSICAL EXAM
[No Acute Distress] : no acute distress [Well Developed] : well developed [Well-Appearing] : well-appearing [Normal Sclera/Conjunctiva] : normal sclera/conjunctiva [PERRL] : pupils equal round and reactive to light [EOMI] : extraocular movements intact [Normal Outer Ear/Nose] : the outer ears and nose were normal in appearance [Normal Oropharynx] : the oropharynx was normal [No JVD] : no jugular venous distention [No Lymphadenopathy] : no lymphadenopathy [Supple] : supple [Thyroid Normal, No Nodules] : the thyroid was normal and there were no nodules present [No Respiratory Distress] : no respiratory distress  [No Accessory Muscle Use] : no accessory muscle use [Clear to Auscultation] : lungs were clear to auscultation bilaterally [Normal Rate] : normal rate  [Regular Rhythm] : with a regular rhythm [Normal S1, S2] : normal S1 and S2 [No Murmur] : no murmur heard [No Carotid Bruits] : no carotid bruits [No Abdominal Bruit] : a ~M bruit was not heard ~T in the abdomen [No Varicosities] : no varicosities [Pedal Pulses Present] : the pedal pulses are present [No Edema] : there was no peripheral edema [No Palpable Aorta] : no palpable aorta [No Extremity Clubbing/Cyanosis] : no extremity clubbing/cyanosis [Soft] : abdomen soft [Non Tender] : non-tender [Non-distended] : non-distended [No Masses] : no abdominal mass palpated [No HSM] : no HSM [Normal Bowel Sounds] : normal bowel sounds [Normal Posterior Cervical Nodes] : no posterior cervical lymphadenopathy [Normal Anterior Cervical Nodes] : no anterior cervical lymphadenopathy [No CVA Tenderness] : no CVA  tenderness [No Spinal Tenderness] : no spinal tenderness [No Joint Swelling] : no joint swelling [Grossly Normal Strength/Tone] : grossly normal strength/tone [No Rash] : no rash [Coordination Grossly Intact] : coordination grossly intact [No Focal Deficits] : no focal deficits [Normal Affect] : the affect was normal [Normal Insight/Judgement] : insight and judgment were intact [de-identified] : underweight [de-identified] : mild erythema at base on nail 3rd finger r hand [de-identified] : small steps shuffling gait with walker

## 2022-04-27 NOTE — ASSESSMENT
[FreeTextEntry1] : The patient's blood pressure currently 100/70. We'll discontinue Benicar. We'll check routine labs. Patient is advised regarding the need to increase her weight. We'll obtain physical therapy consult for gait abnormality. Patient is to return in 3 months.

## 2022-04-27 NOTE — HISTORY OF PRESENT ILLNESS
[FreeTextEntry1] : Followup patient will underweight status. [de-identified] : Patient states she is eating more and has gained about 3 pounds. She now weighs 94 pounds. She occasionally feels lightheaded in the morning and feels better after eating. She is now on Benicar 20 mg daily. She continues with a shuffling gait using a walker. She denies shortness of breath, chest pain or generalized pain. She continues to be anxious that if she eats something that wont agree with her, it will  cause blockage. She did not followup with weight management is recommended

## 2022-05-03 ENCOUNTER — APPOINTMENT (OUTPATIENT)
Dept: GASTROENTEROLOGY | Facility: CLINIC | Age: 77
End: 2022-05-03
Payer: MEDICARE

## 2022-05-03 PROBLEM — L30.9 PERIANAL DERMATITIS: Status: RESOLVED | Noted: 2021-02-18 | Resolved: 2022-05-03

## 2022-05-03 PROCEDURE — 99214 OFFICE O/P EST MOD 30 MIN: CPT

## 2022-05-03 NOTE — HISTORY OF PRESENT ILLNESS
[de-identified] : \par This HPI reflects a summary and review of records : including previous and most recent  Labs, body imaging, consults and progress notes, operative and pathology reports, EKG reports, ED records, found in [facility], [facility] and/or [employer]\par \par \par PCP: Josh\par \par 77 yo F h/o Anxiety, HTN, HLD, Anemia/ neutropenia\par h/o Colon Ca ~ 3.5cm from ICV: s/p R colectomy 7/2000 + 5FU and Leucovorin\par had routine f/u imaging and colonoscopy\par 10/26/18-11/5/18 s/p sbo p/w abd pain, N, Bilious vomiting\par CT w po/IVC: transition pt w/i midline lower abdomen/superior pelvis w adjacent distended non-contrast fluid-filled sb loops extending into pelvis and demonstrating mucosal enhancement w surrounding ascites and mesenteric edema. small midline ventral abdominal  hernia into which a sht segment of mid-TV colon protrudes thru. \par 11/30-- 12/2/19 s/p Adm for sbo; p/w abd pain, N/V, diarrhea; CT w po/IVC: sbo w transition pt w/i pelvis\par 3/5/19: il=362, usu 120\par 9/27/19: wt=94, prn donnatal for cramping\par 1/17/20: wt=90\par 2/20/20 Dr. foto: for wt loss, rash 2 mo prior on hands, had seen derm told of +BEN and ? 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 moreno: epigastric burn, Inc Gas on Pepcid 20mg--> omep 20mg qd\par 5/19/20: Dr. Moreno: occas upper GI distress, always feels hungry, + constipation on Senakot\par Labs: WBC=2.6, Hgb=11.8 , BUN=23, creat=0.5\par \par 6/17/20 CT ABD/PEL w po C: neg\par \par Init CC 6/30/20:  long h/o constipation x years, worsened after SBO's--> went to [facility], also lost a lot wt after each hospitalization, never able to gain it back. wt=93\par over last 1-2 mo c/o epigastric/hypogastric pain: ache--> Left upper quad and flank\par relieved by passing gas\par BMs: #4 q am, then feels like she has to go, but nothing, occas loose/mushy BM later\par + Bloat, no melena, brbpr\par + epigastric burn/ht burn, + belch\par No anorexia, or early satiety\par 7/21/20: no real change, MOM 30cc--> Incr cramping and stool frequency, gas in Left colon which\par released w BMs: # 4 q am, then feels like she has to go, but nothing, occas loose/mushy BM later\par Abd binder felt restrictive--too much , + Bloat \par Wt=94, epigastric: less but an emptiness which decr w eating ~ 11am/4 pm \par Labs:ANCA/ASCA, Celiac/Food Allergy panels--neg, Ca19-9, CEA-wnl, CRP <0.1\par \par 7/29/20: : c/o acute LBP:s/pfall on buttocks ~ 2 wks ago\par Rx w Advil 200 Q6 CC, Duloxetine 20; L/S spine:acute/sub [id number] Fx\par 8/5/20 Pain Management: +Tizanidine 2 mg tid for spasm--didn’t take\par 8/13/20: was doing better on Miralax 1 cap qd & Benefiber 2 gummies q am , until fall, then more constipated: BM: # 1-2, small w strain,1q am, then feels like she has to go, but nothing,\par  +INC evac bloat, wt=90\par * Abd pain—epigastric: less but an emptiness which decr w eating ~ 11am/4 pm \par \par Saw Dr. Cooley--felt not to have PD, but evidence of " mini strokes", to see Dr Bennett and to get\par carotid study and  repeat MRI\par Neuropsych eval reportedly w ? attention deficit\par 11/12/20: on Miralax 1/8  cap qd & Benefiber 2 gummies q am / 1 w lunch\par Wt=up to 101lbs( gained 11lbs in 3 months) \par BMs: more formed, less urgency, but has a fear of SBO if holds back BM, so just releases it willingly in adult diaper--> advise that obstruction is unlikely from marco a the anal sphincter and that actually will worsen sphincter control by atrophy of non-use\par epigastric pain : less but an emptiness which decr w eating;  BMs: # 5, 3-4x/d , less bloat\par \par 2/18/21: Recently developed weston-anal irritation for Incr stooling, LBMs, + Incontinence\par Saw Derm, given Ketoconazole cream, sitz bathes w Vingear\par Also Miralax cut to 1/8 tsp q am, Benefier gummies 2/1/0\par  Trial IB ghada 1 q am \par  Local care w Med powder: light dusting,followed by Ketocon/HC crm , clinton, then vaseline TID\par Stools forming alittle, skin less irritated but still burning, less bloat, some Inc evac\par Intermittent L-sided cramping\par qy=422, BMs : 6, 4x/D--> #5-6, 4x/D\par 3/23/21: ht=677,  BMs: # 5, then # 7 x 2, taking prune juice bid and sennakot \par                + INC evac, less bloat\par                Rx w Miralax 1/8 cap qd, Benefiber 2 w B & 2 w L; Try to taper Sennakot\par \par 5/18/21:  Wt =104 up 2 lbs,  dealing w rash from MCTD on Hydroxy, back better but arms itchy\par               using steroid cream but no Anti-histamines\par                less  epigastric pain, occas LLQ pain,  BMs:  usu #5-6 1-3x/d, w urges, occas # 2\par                less  Constipation, diarrhea, bloat, + Flatulance\par 8/3/21: ve=664, picking at foods again,  trying to push protein, but pt lost her taste for             fish;  rec: chopped liver, tuna, rolled up CC\par 11/9/21: wt = 97, eating well, having 203 ensure qd, walking daily \par \par 2/1/22 : Today: no c/o , CP, SOB/ BECERRA, Cough, Wheeze, Palpitations, edema\par                Most recent labs reviewed w patient:12/2021 : cmet, HgbA1c,vit D, Lipids, b12,tsh,\par                 Hb=11, JAD=261, BUN=30\par \par 2/1/22:   doing well, diet is very limited, having 2 ensure, wt=91\par               BMs:  usu #3-4,  3-5 x/d, occas strain/bloat, + inc evac, less diarrhea\par \par \par 5/3/22: wt=94, eating more, BMs# 3-4, 3-5 x/D, occas loose take 1/2 immodium prn \par \par * Abd pain—> epigastric: less but an emptiness which decr w eating ~ 11am/4 pm \par Hypogastric--no\par LUQ/Flank--no\par LLQ-->  feels like gas, pre BM -->yes\par * Nausea—no\par * Vomit—no\par * Belching—no\par * Regurgitation—no\par * Ht burn—no\par * Throat Clearing—no\par * Globus—no\par * Cough—no\par * Hoarseness—no\par * Dysphagia—no\par * BMs:  usu #3-4,  3-5 x/d,\par * Constipation—> occas strain \par * Inc Evac-> yes\par * Diarrhea—intermittent :  better\par * Bloating--> less\par * Flatulence—> less\par * Gurgling—no\par * Melena—no\par * BPBPR—no\par * Anorexia—no\par * Wt. Loss--> better \par \par \par

## 2022-05-03 NOTE — ASSESSMENT
[FreeTextEntry1] : 1. Abdominal Pain: epigastric--> better, but still present usu when hungry\par                           L-sided-> mostly resolved but usu pre-BM \par assoc w constipation/bloat; dyspepsia--> less\par \par Recently w Diarrhea: LBMs, stool frequency & Grecia-anal dermatitis--> better\par \par wt loss--had  avoidance 2/2 constipation/ Dulox-->decr appetite: better on Remeron \par mild anemia\par PE w reducible incisional hernia\par \par --s/p R. colon resection 7/2000 for Colon Ca\par s/p sbo x 2\par CT Abd/Pelv w po C: diverticulosis--6/2020\par 12/4/18 SBFT: wnl\par \par Perhaps some  low grade partial obstructive physiology w hernia/adhesive dis \par \par DDX: SIBO w MCTD, s/p RHC\par          Bile-induced diarrhea\par          Malabsorption\par          Colonic Neoplasia\par          IBD --unlikely w normal CRP/IBD panel                                                                                                                            Functional Bowel D/O-->more likely   \par          GERD/Gastritis                                                                                                          \par \par      \par \par P: diet is LR, lactose free, low fat , need protein: Ensure 1 bid --- at least\par will need to transition diet to have provide some bulking  agent: Benefiber 2 gummies 2-3 x /D CC\par anti-reflux diet\par adequate fluid \par reg exercise\par \par check stool for fecal fat/calprotectin -->not returned \par     Senakot  1 qd \par     MOM 15-30cc q afternoon --held 2/2 cramping/inc stool freq\par     Miralax 1 cap--> 1/2 BID-->1/4 BID--> 1/8 Tsp  BID --> 1/8 tsp q am \par     Prune Juice 1/4 cup qd w water prn but only once daily\par     Benefiber Gummies  2 w Breakfast, 1 w lunch/ 2 w Dinner \par     D/C Cymbalta 20-->constipating \par    Remeron 7.5mg q hs \par    ? additonal neuromodulation w potentials : / Buspar 2.5 bid/ FDgard/ Creon / rotating ABX\par     Omep 20 q am & Pepcid pre lunch and dinner --> taper off \par     Florastor 1 qd or other 2 BID \par     Trial IB ghada 1 q am -->no\par     Local care w Medicated powder: light dusting, followed by Ketocon/HC cream , calmoseptine, then vaseline TID\par     No sitz bathes w vinegar, pat dry\par     Abd binder --slowly increase usage to decondition\par \par    EGD--on hold\par   Colonoscopy--on hold \par   \par \par surgical consult for hernia repair at tertiary center\par \par \par \par 2. Diverticulosis:  well - controlled.  No pain,  infection,  bleeding\par Recommendations\par * Discussed  previously\par the potential complications of perforation,  pain,  infection,  bleeding \par *  currently Low/mod  –Fiber Diet was  emphasized\par * Daily fluid intake was reviewed : 6  --  8 cups of decaffeinated fluid daily was emphasized \par \par \par \par \par \par 3. Hemorrhoids:  well - controlled.  No pain,  swelling,  itch,  bleeding\par * Discussed  previously\par the potential complications of thrombosis,  pain,  infection,  swelling, itching,  bleeding \par Recommendations: \par *  currently Low/mod- Fiber Diet was emphasized\par * 6  --  8 cups of decaffeinated fluid daily was emphasized \par * Sitz Bathes BID prn, Anusol HC  Suppos / Cream  MA BID -- was needed\par * No:  Tucks BID,  Balneol Lotion,   Calmoseptine Oint -- was needed ;    can use  Prep H prn\par * No:  need for  Colorectal surgical evaluation for possible ablation\par \par \par  \par \par \par \par 4. Grecia Anal Dermatitis: Recently w Diarrhea: LBMs, stool frequency: better \par     \par     Benefiber Gummies 2 w Breakfast, 1 w  lunch/ 2 w dinner \par     Miralax dropped to 1/8 tsp q am \par     Try to taper senna and prune juice \par    Remeron 7.5mg q hs \par     Florastor 1 qd or other probiotics 2 BID \par     Trial IB ghada on hold \par     Local care w Medicated powder: light dusting, followed by Ketocon/HC cream , calmoseptine, then vaseline TID\par     No sitz bathes w vinegar, pat dry\par \par \par \par \par \par

## 2022-05-05 LAB
25(OH)D3 SERPL-MCNC: 45.5 NG/ML
ALBUMIN SERPL ELPH-MCNC: 4.2 G/DL
ALP BLD-CCNC: 97 U/L
ALT SERPL-CCNC: 15 U/L
ANION GAP SERPL CALC-SCNC: 13 MMOL/L
AST SERPL-CCNC: 24 U/L
BASOPHILS # BLD AUTO: 0.01 K/UL
BASOPHILS NFR BLD AUTO: 0.3 %
BILIRUB SERPL-MCNC: 0.2 MG/DL
BUN SERPL-MCNC: 28 MG/DL
CALCIUM SERPL-MCNC: 9.7 MG/DL
CHLORIDE SERPL-SCNC: 102 MMOL/L
CHOLEST SERPL-MCNC: 137 MG/DL
CO2 SERPL-SCNC: 24 MMOL/L
CREAT SERPL-MCNC: 0.77 MG/DL
EGFR: 80 ML/MIN/1.73M2
EOSINOPHIL # BLD AUTO: 0.06 K/UL
EOSINOPHIL NFR BLD AUTO: 1.6 %
ESTIMATED AVERAGE GLUCOSE: 114 MG/DL
GLUCOSE SERPL-MCNC: 113 MG/DL
HBA1C MFR BLD HPLC: 5.6 %
HCT VFR BLD CALC: 33.4 %
HDLC SERPL-MCNC: 52 MG/DL
HGB BLD-MCNC: 10.5 G/DL
IMM GRANULOCYTES NFR BLD AUTO: 0 %
LDLC SERPL CALC-MCNC: 55 MG/DL
LYMPHOCYTES # BLD AUTO: 0.81 K/UL
LYMPHOCYTES NFR BLD AUTO: 21.4 %
MAN DIFF?: NORMAL
MCHC RBC-ENTMCNC: 31.3 PG
MCHC RBC-ENTMCNC: 31.4 GM/DL
MCV RBC AUTO: 99.7 FL
MONOCYTES # BLD AUTO: 0.37 K/UL
MONOCYTES NFR BLD AUTO: 9.8 %
NEUTROPHILS # BLD AUTO: 2.54 K/UL
NEUTROPHILS NFR BLD AUTO: 66.9 %
NONHDLC SERPL-MCNC: 85 MG/DL
PLATELET # BLD AUTO: 206 K/UL
POTASSIUM SERPL-SCNC: 4.4 MMOL/L
PROT SERPL-MCNC: 7.1 G/DL
RBC # BLD: 3.35 M/UL
RBC # FLD: 12.3 %
SODIUM SERPL-SCNC: 139 MMOL/L
TRIGL SERPL-MCNC: 149 MG/DL
TSH SERPL-ACNC: 1.12 UIU/ML
VIT B12 SERPL-MCNC: 457 PG/ML
WBC # FLD AUTO: 3.79 K/UL

## 2022-05-20 ENCOUNTER — APPOINTMENT (OUTPATIENT)
Dept: CARDIOLOGY | Facility: CLINIC | Age: 77
End: 2022-05-20

## 2022-07-20 ENCOUNTER — APPOINTMENT (OUTPATIENT)
Dept: CARDIOLOGY | Facility: CLINIC | Age: 77
End: 2022-07-20

## 2022-07-20 ENCOUNTER — NON-APPOINTMENT (OUTPATIENT)
Age: 77
End: 2022-07-20

## 2022-07-20 VITALS
HEART RATE: 79 BPM | SYSTOLIC BLOOD PRESSURE: 127 MMHG | WEIGHT: 91 LBS | DIASTOLIC BLOOD PRESSURE: 60 MMHG | HEIGHT: 62 IN | BODY MASS INDEX: 16.75 KG/M2 | OXYGEN SATURATION: 99 %

## 2022-07-20 DIAGNOSIS — I73.00 RAYNAUD'S SYNDROME W/OUT GANGRENE: ICD-10-CM

## 2022-07-20 DIAGNOSIS — R76.8 OTHER SPECIFIED ABNORMAL IMMUNOLOGICAL FINDINGS IN SERUM: ICD-10-CM

## 2022-07-20 PROCEDURE — 93000 ELECTROCARDIOGRAM COMPLETE: CPT

## 2022-07-20 PROCEDURE — 99214 OFFICE O/P EST MOD 30 MIN: CPT

## 2022-07-20 RX ORDER — ASPIRIN ENTERIC COATED TABLETS 81 MG 81 MG/1
81 TABLET, DELAYED RELEASE ORAL
Refills: 0 | Status: ACTIVE | COMMUNITY
Start: 2022-07-20

## 2022-07-20 NOTE — CARDIOLOGY SUMMARY
[___] : [unfilled] [de-identified] : 7/20/22-nsr [de-identified] : ze 11/2020- rare ectopy [de-identified] : 12/2020 ef 70% pasp 30-35mmhg [de-identified] : 12/2020 mild calcified focal plaque lica

## 2022-07-28 ENCOUNTER — APPOINTMENT (OUTPATIENT)
Dept: INTERNAL MEDICINE | Facility: CLINIC | Age: 77
End: 2022-07-28

## 2022-07-28 VITALS
OXYGEN SATURATION: 98 % | HEIGHT: 62 IN | WEIGHT: 95 LBS | BODY MASS INDEX: 17.48 KG/M2 | HEART RATE: 83 BPM | DIASTOLIC BLOOD PRESSURE: 80 MMHG | SYSTOLIC BLOOD PRESSURE: 130 MMHG | TEMPERATURE: 96.5 F

## 2022-07-28 PROCEDURE — 99214 OFFICE O/P EST MOD 30 MIN: CPT

## 2022-07-28 NOTE — ASSESSMENT
[FreeTextEntry1] : Patient has been stable over the past 3 months. Her weight is unchanged at 95. I have again recommended patient see a nutritionist regarding underweight status. Patient also will followup with physical therapy for gait training. Labs have been reviewed with her 3 months ago which were all very good except for mild anemia. Labs will be drawn 3 months.

## 2022-07-28 NOTE — PHYSICAL EXAM
[No Acute Distress] : no acute distress [Well Developed] : well developed [Well-Appearing] : well-appearing [Normal Sclera/Conjunctiva] : normal sclera/conjunctiva [PERRL] : pupils equal round and reactive to light [EOMI] : extraocular movements intact [Normal Outer Ear/Nose] : the outer ears and nose were normal in appearance [Normal Oropharynx] : the oropharynx was normal [No JVD] : no jugular venous distention [No Lymphadenopathy] : no lymphadenopathy [Supple] : supple [Thyroid Normal, No Nodules] : the thyroid was normal and there were no nodules present [No Respiratory Distress] : no respiratory distress  [No Accessory Muscle Use] : no accessory muscle use [Clear to Auscultation] : lungs were clear to auscultation bilaterally [Normal Rate] : normal rate  [Regular Rhythm] : with a regular rhythm [Normal S1, S2] : normal S1 and S2 [No Murmur] : no murmur heard [No Carotid Bruits] : no carotid bruits [No Abdominal Bruit] : a ~M bruit was not heard ~T in the abdomen [No Varicosities] : no varicosities [Pedal Pulses Present] : the pedal pulses are present [No Edema] : there was no peripheral edema [No Palpable Aorta] : no palpable aorta [No Extremity Clubbing/Cyanosis] : no extremity clubbing/cyanosis [Soft] : abdomen soft [Non Tender] : non-tender [Non-distended] : non-distended [No Masses] : no abdominal mass palpated [No HSM] : no HSM [Normal Bowel Sounds] : normal bowel sounds [Normal Posterior Cervical Nodes] : no posterior cervical lymphadenopathy [Normal Anterior Cervical Nodes] : no anterior cervical lymphadenopathy [No Joint Swelling] : no joint swelling [Grossly Normal Strength/Tone] : grossly normal strength/tone [No Rash] : no rash [No Focal Deficits] : no focal deficits [Normal Affect] : the affect was normal [Normal Insight/Judgement] : insight and judgment were intact [de-identified] : underweight [de-identified] : mild erythema at base on nail 3rd finger r hand [de-identified] : small steps shuffling gait with walker

## 2022-07-28 NOTE — HISTORY OF PRESENT ILLNESS
[FreeTextEntry1] : Followup underweight status. [de-identified] : Patient's weight is essentially unchanged at 95 pounds. She states she is trying to eat more. She went to physical therapy for 2 sessions. She is currently on Benicar with a blood pressure over 70. She has no further lightheadedness. She walks with a cane for balance. Her short term memory is slightly impaired as per her . She eats a very limited diet including ice cream, ravioli and peanut butter. She does not eat chicken, fish or turkey. She eats fruit. I tried to get the patient to see a nutritionist but she has not complied as yet.

## 2022-08-04 ENCOUNTER — RX RENEWAL (OUTPATIENT)
Age: 77
End: 2022-08-04

## 2022-08-31 ENCOUNTER — NON-APPOINTMENT (OUTPATIENT)
Age: 77
End: 2022-08-31

## 2022-09-19 ENCOUNTER — APPOINTMENT (OUTPATIENT)
Dept: PULMONOLOGY | Facility: CLINIC | Age: 77
End: 2022-09-19

## 2022-09-19 ENCOUNTER — APPOINTMENT (OUTPATIENT)
Dept: INTERNAL MEDICINE | Facility: CLINIC | Age: 77
End: 2022-09-19

## 2022-09-20 ENCOUNTER — APPOINTMENT (OUTPATIENT)
Dept: GASTROENTEROLOGY | Facility: CLINIC | Age: 77
End: 2022-09-20

## 2022-09-21 ENCOUNTER — APPOINTMENT (OUTPATIENT)
Dept: INTERNAL MEDICINE | Facility: CLINIC | Age: 77
End: 2022-09-21

## 2022-09-21 VITALS
HEART RATE: 94 BPM | RESPIRATION RATE: 18 BRPM | HEIGHT: 61 IN | TEMPERATURE: 96.8 F | DIASTOLIC BLOOD PRESSURE: 70 MMHG | SYSTOLIC BLOOD PRESSURE: 130 MMHG | OXYGEN SATURATION: 99 % | WEIGHT: 90 LBS | BODY MASS INDEX: 16.99 KG/M2

## 2022-09-21 DIAGNOSIS — Z87.898 PERSONAL HISTORY OF OTHER SPECIFIED CONDITIONS: ICD-10-CM

## 2022-09-21 PROCEDURE — 99214 OFFICE O/P EST MOD 30 MIN: CPT

## 2022-09-21 NOTE — PHYSICAL EXAM
[No Acute Distress] : no acute distress [Well Developed] : well developed [Well-Appearing] : well-appearing [Normal Sclera/Conjunctiva] : normal sclera/conjunctiva [PERRL] : pupils equal round and reactive to light [EOMI] : extraocular movements intact [Normal Outer Ear/Nose] : the outer ears and nose were normal in appearance [Normal Oropharynx] : the oropharynx was normal [No JVD] : no jugular venous distention [No Lymphadenopathy] : no lymphadenopathy [Supple] : supple [Thyroid Normal, No Nodules] : the thyroid was normal and there were no nodules present [No Respiratory Distress] : no respiratory distress  [No Accessory Muscle Use] : no accessory muscle use [Clear to Auscultation] : lungs were clear to auscultation bilaterally [Normal Rate] : normal rate  [Regular Rhythm] : with a regular rhythm [Normal S1, S2] : normal S1 and S2 [No Murmur] : no murmur heard [No Carotid Bruits] : no carotid bruits [No Abdominal Bruit] : a ~M bruit was not heard ~T in the abdomen [No Varicosities] : no varicosities [Pedal Pulses Present] : the pedal pulses are present [No Edema] : there was no peripheral edema [No Palpable Aorta] : no palpable aorta [No Extremity Clubbing/Cyanosis] : no extremity clubbing/cyanosis [Soft] : abdomen soft [Non Tender] : non-tender [Non-distended] : non-distended [No Masses] : no abdominal mass palpated [No HSM] : no HSM [Normal Bowel Sounds] : normal bowel sounds [Normal Posterior Cervical Nodes] : no posterior cervical lymphadenopathy [Normal Anterior Cervical Nodes] : no anterior cervical lymphadenopathy [No Joint Swelling] : no joint swelling [Grossly Normal Strength/Tone] : grossly normal strength/tone [No Rash] : no rash [No Focal Deficits] : no focal deficits [Normal Affect] : the affect was normal [Normal Insight/Judgement] : insight and judgment were intact [de-identified] : underweight [de-identified] : mild erythema at base on nail 3rd finger r hand [de-identified] : small steps shuffling gait with walker

## 2022-09-21 NOTE — ASSESSMENT
[FreeTextEntry1] : Patient is clearly failing. She has lost 7 pounds over the past 2 months. She is unable to bathe herself or dress herself. She is eating less and less. I have increased her Remeron to 30 mg h.s. I discussed the possibility of a feeding tube with the pt. She states she absolutely does not want a feeding tube at this time. However she wants to do everything she can to continue living. I feel she should discuss these issues with her family. I will make a referral to [facility] to attempt to obtain a home health aid.

## 2022-09-21 NOTE — HISTORY OF PRESENT ILLNESS
[FreeTextEntry1] : Underweight status. [de-identified] : Patient is eating less and less. She has no appetite. She now weighs 88 pounds which is down 7 pounds. She is getting weaker. She walks short distances with a walker. Her  is the sole caregiver. She is unable to dress herself and bathe. She is having implants made for her front lower teeth but her  does not think that is the issue with her p.o. intake. She eats mainly puréed foods. She eats peanut butter and crackers. She has mild memory impairment. She comes in now with her  asking for a home health aide to help him care for her.

## 2022-09-22 ENCOUNTER — NON-APPOINTMENT (OUTPATIENT)
Age: 77
End: 2022-09-22

## 2022-09-22 PROBLEM — Z87.898 HISTORY OF FAILURE TO THRIVE SYNDROME: Status: RESOLVED | Noted: 2022-09-21 | Resolved: 2022-09-22

## 2022-10-09 ENCOUNTER — NON-APPOINTMENT (OUTPATIENT)
Age: 77
End: 2022-10-09

## 2022-11-23 ENCOUNTER — APPOINTMENT (OUTPATIENT)
Dept: INTERNAL MEDICINE | Facility: CLINIC | Age: 77
End: 2022-11-23

## 2022-11-23 VITALS
WEIGHT: 101 LBS | SYSTOLIC BLOOD PRESSURE: 140 MMHG | HEIGHT: 61 IN | DIASTOLIC BLOOD PRESSURE: 80 MMHG | TEMPERATURE: 97.1 F | BODY MASS INDEX: 19.07 KG/M2

## 2022-11-23 PROCEDURE — 90662 IIV NO PRSV INCREASED AG IM: CPT

## 2022-11-23 PROCEDURE — 36415 COLL VENOUS BLD VENIPUNCTURE: CPT

## 2022-11-23 PROCEDURE — 99214 OFFICE O/P EST MOD 30 MIN: CPT | Mod: 25

## 2022-11-23 PROCEDURE — G0008: CPT

## 2022-11-23 NOTE — PHYSICAL EXAM
[No Acute Distress] : no acute distress [Well Developed] : well developed [Well-Appearing] : well-appearing [Normal Sclera/Conjunctiva] : normal sclera/conjunctiva [PERRL] : pupils equal round and reactive to light [EOMI] : extraocular movements intact [Normal Outer Ear/Nose] : the outer ears and nose were normal in appearance [Normal Oropharynx] : the oropharynx was normal [No JVD] : no jugular venous distention [No Lymphadenopathy] : no lymphadenopathy [Supple] : supple [Thyroid Normal, No Nodules] : the thyroid was normal and there were no nodules present [No Respiratory Distress] : no respiratory distress  [No Accessory Muscle Use] : no accessory muscle use [Clear to Auscultation] : lungs were clear to auscultation bilaterally [Normal Rate] : normal rate  [Regular Rhythm] : with a regular rhythm [Normal S1, S2] : normal S1 and S2 [No Murmur] : no murmur heard [No Carotid Bruits] : no carotid bruits [No Abdominal Bruit] : a ~M bruit was not heard ~T in the abdomen [No Varicosities] : no varicosities [Pedal Pulses Present] : the pedal pulses are present [No Edema] : there was no peripheral edema [No Palpable Aorta] : no palpable aorta [No Extremity Clubbing/Cyanosis] : no extremity clubbing/cyanosis [Soft] : abdomen soft [Non Tender] : non-tender [Non-distended] : non-distended [No Masses] : no abdominal mass palpated [No HSM] : no HSM [Normal Bowel Sounds] : normal bowel sounds [Normal Posterior Cervical Nodes] : no posterior cervical lymphadenopathy [Normal Anterior Cervical Nodes] : no anterior cervical lymphadenopathy [No Joint Swelling] : no joint swelling [Grossly Normal Strength/Tone] : grossly normal strength/tone [No Rash] : no rash [No Focal Deficits] : no focal deficits [Normal Affect] : the affect was normal [Normal Insight/Judgement] : insight and judgment were intact [de-identified] : underweight [de-identified] : small steps shuffling gait with walker

## 2022-11-23 NOTE — ASSESSMENT
[FreeTextEntry1] : Patient has gained a few pounds and now weighs 101. We'll check routine labs. Patient is to call for results. I've discussed with her the need to increase her weight by about 10 pounds.

## 2022-11-23 NOTE — HISTORY OF PRESENT ILLNESS
[FreeTextEntry1] : Followup underweight status [de-identified] : Patient is eating a little more and has gained a few pounds. She now is weighing 101. She is sleeping well. She states she is always hungry. She is on Remeron 30 mg h.s. She does not eat much protein. She snacks a lot.

## 2022-11-29 ENCOUNTER — APPOINTMENT (OUTPATIENT)
Dept: GASTROENTEROLOGY | Facility: CLINIC | Age: 77
End: 2022-11-29

## 2022-11-29 VITALS
BODY MASS INDEX: 19.63 KG/M2 | SYSTOLIC BLOOD PRESSURE: 138 MMHG | HEIGHT: 61 IN | DIASTOLIC BLOOD PRESSURE: 76 MMHG | WEIGHT: 104 LBS | HEART RATE: 88 BPM

## 2022-11-29 DIAGNOSIS — K56.609 UNSPECIFIED INTESTINAL OBSTRUCTION, UNSPECIFIED AS TO PARTIAL VERSUS COMPLETE OBSTRUCTION: ICD-10-CM

## 2022-11-29 DIAGNOSIS — K64.8 OTHER HEMORRHOIDS: ICD-10-CM

## 2022-11-29 DIAGNOSIS — Z85.038 PERSONAL HISTORY OF OTHER MALIGNANT NEOPLASM OF LARGE INTESTINE: ICD-10-CM

## 2022-11-29 DIAGNOSIS — K57.90 DIVERTICULOSIS OF INTESTINE, PART UNSPECIFIED, W/OUT PERFORATION OR ABSCESS W/OUT BLEEDING: ICD-10-CM

## 2022-11-29 PROCEDURE — 99214 OFFICE O/P EST MOD 30 MIN: CPT

## 2022-11-29 NOTE — HISTORY OF PRESENT ILLNESS
[de-identified] : \par This HPI reflects a summary and review of records : including previous and most recent  Labs, body imaging, consults and progress notes, operative and pathology reports, EKG reports, ED records, found in [facility], [facility] and/or [facility]\par \par \par PCP: Josh\par \par 76 yo F h/o Anxiety, HTN, HLD, Anemia/ neutropenia\par h/o Colon Ca ~ 3.5cm from ICV: s/p R colectomy 7/2000 + 5FU and Leucovorin\par had routine f/u imaging and colonoscopy\par 10/26/18-11/5/18 s/p sbo p/w abd pain, N, Bilious vomiting\par CT w po/IVC: transition pt w/i midline lower abdomen/superior pelvis w adjacent distended non-contrast fluid-filled sb loops extending into pelvis and demonstrating mucosal enhancement w surrounding ascites and mesenteric edema. small midline ventral abdominal  hernia into which a sht segment of mid-TV colon protrudes thru. \par 11/30-- 12/2/19 s/p Adm for sbo; p/w abd pain, N/V, diarrhea; CT w po/IVC: sbo w transition pt w/i pelvis\par 3/5/19: er=428, usu 120\par 9/27/19: wt=94, prn donnatal for cramping\par 1/17/20: wt=90\par 2/20/20 Dr. foto: for wt loss, rash 2 mo prior on hands, had seen derm told of +BEN and ? 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 moreno: epigastric burn, Inc Gas on Pepcid 20mg--> omep 20mg qd\par 5/19/20: Dr. Moreno: occas upper GI distress, always feels hungry, + constipation on Senakot\par Labs: WBC=2.6, Hgb=11.8 , BUN=23, creat=0.5\par \par 6/17/20 CT ABD/PEL w po C: neg\par \par Init CC 6/30/20:  long h/o constipation x years, worsened after SBO's--> went to [facility], also lost a lot wt after each hospitalization, never able to gain it back. wt=93\par over last 1-2 mo c/o epigastric/hypogastric pain: ache--> Left upper quad and flank\par relieved by passing gas\par BMs: #4 q am, then feels like she has to go, but nothing, occas loose/mushy BM later\par + Bloat, no melena, brbpr\par + epigastric burn/ht burn, + belch\par No anorexia, or early satiety\par 7/21/20: no real change, MOM 30cc--> Incr cramping and stool frequency, gas in Left colon which\par released w BMs: # 4 q am, then feels like she has to go, but nothing, occas loose/mushy BM later\par Abd binder felt restrictive--too much , + Bloat \par Wt=94, epigastric: less but an emptiness which decr w eating ~ 11am/4 pm \par Labs:ANCA/ASCA, Celiac/Food Allergy panels--neg, Ca19-9, CEA-wnl, CRP <0.1\par \par 7/29/20: : c/o acute LBP:s/pfall on buttocks ~ 2 wks ago\par Rx w Advil 200 Q6 CC, Duloxetine 20; L/S spine:acute/sub [id number] Fx\par 8/5/20 Pain Management: +Tizanidine 2 mg tid for spasm--didn’t take\par 8/13/20: was doing better on Miralax 1 cap qd & Benefiber 2 gummies q am , until fall, then more constipated: BM: # 1-2, small w strain,1q am, then feels like she has to go, but nothing,\par  +INC evac bloat, wt=90\par * Abd pain—epigastric: less but an emptiness which decr w eating ~ 11am/4 pm \par \par Saw Dr. Cooley--felt not to have PD, but evidence of " mini strokes", to see Dr Bennett and to get\par carotid study and  repeat MRI\par Neuropsych eval reportedly w ? attention deficit\par 11/12/20: on Miralax 1/8  cap qd & Benefiber 2 gummies q am / 1 w lunch\par Wt=up to 101lbs( gained 11lbs in 3 months) \par BMs: more formed, less urgency, but has a fear of SBO if holds back BM, so just releases it willingly in adult diaper--> advise that obstruction is unlikely from marco a the anal sphincter and that actually will worsen sphincter control by atrophy of non-use\par epigastric pain : less but an emptiness which decr w eating;  BMs: # 5, 3-4x/d , less bloat\par \par 2/18/21: Recently developed weston-anal irritation for Incr stooling, LBMs, + Incontinence\par Saw Derm, given Ketoconazole cream, sitz bathes w Vingear\par Also Miralax cut to 1/8 tsp q am, Benefier gummies 2/1/0\par  Trial IB ghada 1 q am \par  Local care w Med powder: light dusting,followed by Ketocon/HC crm , clinton, then vaseline TID\par Stools forming alittle, skin less irritated but still burning, less bloat, some Inc evac\par Intermittent L-sided cramping\par nj=547, BMs : 6, 4x/D--> #5-6, 4x/D\par 3/23/21: gy=369,  BMs: # 5, then # 7 x 2, taking prune juice bid and sennakot \par                + INC evac, less bloat\par                Rx w Miralax 1/8 cap qd, Benefiber 2 w B & 2 w L; Try to taper Sennakot\par \par 5/18/21:  Wt =104 up 2 lbs,  dealing w rash from MCTD on Hydroxy, back better but arms itchy\par               using steroid cream but no Anti-histamines\par                less  epigastric pain, occas LLQ pain,  BMs:  usu #5-6 1-3x/d, w urges, occas # 2\par                less  Constipation, diarrhea, bloat, + Flatulance\par 8/3/21: yo=489, picking at foods again,  trying to push protein, but pt lost her taste for             fish;  rec: chopped liver, tuna, rolled up CC\par 11/9/21: wt = 97, eating well, having 203 ensure qd, walking daily \par \par 2/1/22 : Today: no c/o , CP, SOB/ BECERRA, Cough, Wheeze, Palpitations, edema\par                Most recent labs reviewed w patient:12/2021 : cmet, HgbA1c,vit D, Lipids, b12,tsh,\par                 Hb=11, HGQ=473, BUN=30\par \par 2/1/22:   doing well, diet is very limited, having 2 ensure, wt=91\par               BMs:  usu #3-4,  3-5 x/d, occas strain/bloat, + inc evac, less diarrhea\par \par \par 5/3/22: wt=94, eating more, BMs# 3-4, 3-5 x/D, occas loose take 1/2 immodium prn \par                         occas strain, less bloat/flatulance, + inc evac\par \par 11/22/22 Labs: Hgb=10, DQA=760, BUN=32, cmet, lipids--> wnl \par \par 11/29/22: wt= 104, up 10 lbs , taking ensure BID\par * Abd pain—> epigastric: less but an emptiness which decr w eating ~ 11am/4 pm \par Hypogastric--no\par LUQ/Flank--no\par LLQ-->  feels like gas, pre BM -->occas \par * Nausea—no\par * Vomit—no\par * Belching—no\par * Regurgitation—no\par * Ht burn—no\par * Throat Clearing—no\par * Globus—no\par * Cough—no\par * Hoarseness—no\par * Dysphagia—no\par * BMs:  usu #3-4,  3-5 x/d,\par * Constipation—> occas strain \par * Inc Evac-> yes\par * Diarrhea—intermittent :  better\par * Bloating--> no \par * Flatulence—> yes\par * Melena—no\par * BPBPR—no\par * Anorexia—no\par * Wt. Loss--> up \par \par \par

## 2022-11-29 NOTE — ASSESSMENT
[FreeTextEntry1] : 1. Abdominal Pain: epigastric--> better, but still present usu when hungry\par                           L-sided-> mostly resolved but usu pre-BM \par assoc w constipation/bloat; dyspepsia--> much less\par \par Recently had  Diarrhea: LBMs, stool frequency & Grecia-anal dermatitis--> much  better\par \par wt loss--had  avoidance 2/2 constipation/ Dulox-->decr appetite: better on Remeron \par mild anemia\par PE w reducible incisional hernia\par \par --s/p R. colon resection 7/2000 for Colon Ca\par s/p sbo x 2\par CT Abd/Pelv w po C: diverticulosis--6/2020\par 12/4/18 SBFT: wnl\par \par Perhaps some  low grade partial obstructive physiology w hernia/adhesive dis \par \par DDX: SIBO w MCTD, s/p RHC\par          Bile-induced diarrhea\par          Malabsorption\par          Colonic Neoplasia\par          IBD --unlikely w normal CRP/IBD panel                                                                                                                            Functional Bowel D/O-->more likely   \par          GERD/Gastritis                                                                                                          \par \par      \par \par P: diet is LR, lactose free, low fat , need protein: Ensure 1 bid --- at least\par will need to transition diet to have provide some bulking  agent: Benefiber 2 gummies  3 x /D CC\par anti-reflux diet\par adequate fluid \par reg exercise\par \par check stool for fecal fat/calprotectin -->not returned \par     Senakot  1 qd --> d/c \par     MOM 15-30cc q afternoon --held 2/2 cramping/inc stool freq\par     Miralax 1 cap--> 1/2 BID-->1/4 BID--> 1/8 Tsp  BID --> 1/8 tsp q am \par     Prune Juice 1/4 cup qd w water prn but only once daily\par     Benefiber Gummies  2 w Breakfast, 2 w lunch/ 2 w Dinner \par     D/C Cymbalta 20-->constipating \par    Remeron 30 mg q hs \par    ? additonal neuromodulation w potentials : / Buspar 2.5 bid/ FDgard/ Creon / rotating ABX\par     Omep 20 q am\par      Pepcid pre lunch and dinner --> taper off \par     Florastor 1 qd or other 2 BID \par     Trial IB ghada 1 q am -->no\par     Local care w Medicated powder: light dusting, followed by Ketocon/HC cream , calmoseptine, then vaseline TID\par     No sitz bathes w vinegar, pat dry\par     Abd binder --slowly increase usage to decondition\par \par    EGD--on hold\par   Colonoscopy--on hold \par   \par surgical consult for hernia repair at tertiary center\par \par \par \par 2. Diverticulosis:  well - controlled.  No pain,  infection,  bleeding\par Recommendations\par * Discussed  previously\par the potential complications of perforation,  pain,  infection,  bleeding \par *  currently Low/mod  –Fiber Diet was  emphasized\par * Daily fluid intake was reviewed : 6  --  8 cups of decaffeinated fluid daily was emphasized \par \par \par \par \par \par 3. Hemorrhoids:  well - controlled.  No pain,  swelling,  itch,  bleeding\par * Discussed  previously\par the potential complications of thrombosis,  pain,  infection,  swelling, itching,  bleeding \par Recommendations: \par *  currently Low/mod- Fiber Diet was emphasized\par * 6  --  8 cups of decaffeinated fluid daily was emphasized \par * Sitz Bathes BID prn, Anusol HC  Suppos / Cream  AK BID -- was needed\par * No:  Tucks BID,  Balneol Lotion,   Calmoseptine Oint -- was needed ;    can use  Prep H prn\par * No:  need for  Colorectal surgical evaluation for possible ablation\par \par \par  \par \par \par \par 4. Grecia Anal Dermatitis: Recently had  Diarrhea: LBMs, stool frequency: much better \par     \par     Benefiber Gummies 2 w Breakfast, 2 w  lunch/ 2 w dinner \par     Miralax dropped to 1/8 tsp q am \par     tapered  senna and prune juice \par    Remeron 30 mg q hs \par     Florastor 1 qd or other probiotics 2 BID \par     Trial IB ghada on hold \par     Local care w Medicated powder: light dusting, followed by Ketocon/HC cream , calmoseptine, then vaseline TID\par     No sitz bathes w vinegar, pat dry\par \par \par \par \par \par

## 2022-11-29 NOTE — REVIEW OF SYSTEMS
[Heart Rate Is Fast] : the heart rate was not fast [Skin Lesions] : no skin lesions [Confused] : no confusion [Easy Bruising] : no tendency for easy bruising

## 2022-12-02 LAB
ALBUMIN SERPL ELPH-MCNC: 4.5 G/DL
ALP BLD-CCNC: 92 U/L
ALT SERPL-CCNC: 19 U/L
ANION GAP SERPL CALC-SCNC: 12 MMOL/L
AST SERPL-CCNC: 29 U/L
BASOPHILS # BLD AUTO: 0.01 K/UL
BASOPHILS NFR BLD AUTO: 0.1 %
BILIRUB SERPL-MCNC: 0.2 MG/DL
BUN SERPL-MCNC: 32 MG/DL
CALCIUM SERPL-MCNC: 10.4 MG/DL
CHLORIDE SERPL-SCNC: 102 MMOL/L
CHOLEST SERPL-MCNC: 177 MG/DL
CO2 SERPL-SCNC: 24 MMOL/L
CREAT SERPL-MCNC: 0.78 MG/DL
EGFR: 78 ML/MIN/1.73M2
EOSINOPHIL # BLD AUTO: 0 K/UL
EOSINOPHIL NFR BLD AUTO: 0 %
ESTIMATED AVERAGE GLUCOSE: 108 MG/DL
GLUCOSE SERPL-MCNC: 114 MG/DL
HBA1C MFR BLD HPLC: 5.4 %
HCT VFR BLD CALC: 32.4 %
HDLC SERPL-MCNC: 79 MG/DL
HGB BLD-MCNC: 10.1 G/DL
IMM GRANULOCYTES NFR BLD AUTO: 0.4 %
LDLC SERPL CALC-MCNC: 70 MG/DL
LYMPHOCYTES # BLD AUTO: 1.36 K/UL
LYMPHOCYTES NFR BLD AUTO: 19.9 %
MAN DIFF?: NORMAL
MCHC RBC-ENTMCNC: 31.2 GM/DL
MCHC RBC-ENTMCNC: 31.4 PG
MCV RBC AUTO: 100.6 FL
MONOCYTES # BLD AUTO: 0.55 K/UL
MONOCYTES NFR BLD AUTO: 8 %
NEUTROPHILS # BLD AUTO: 4.89 K/UL
NEUTROPHILS NFR BLD AUTO: 71.6 %
NONHDLC SERPL-MCNC: 97 MG/DL
PLATELET # BLD AUTO: 353 K/UL
POTASSIUM SERPL-SCNC: 4.6 MMOL/L
PROT SERPL-MCNC: 8 G/DL
RBC # BLD: 3.22 M/UL
RBC # FLD: 14.5 %
SODIUM SERPL-SCNC: 139 MMOL/L
TRIGL SERPL-MCNC: 137 MG/DL
WBC # FLD AUTO: 6.84 K/UL

## 2023-01-24 ENCOUNTER — APPOINTMENT (OUTPATIENT)
Dept: GERIATRICS | Facility: CLINIC | Age: 78
End: 2023-01-24
Payer: MEDICARE

## 2023-01-24 VITALS
HEART RATE: 104 BPM | OXYGEN SATURATION: 96 % | DIASTOLIC BLOOD PRESSURE: 66 MMHG | SYSTOLIC BLOOD PRESSURE: 130 MMHG | WEIGHT: 110 LBS | BODY MASS INDEX: 20.78 KG/M2 | TEMPERATURE: 98.8 F

## 2023-01-24 PROCEDURE — 99204 OFFICE O/P NEW MOD 45 MIN: CPT

## 2023-01-24 RX ORDER — MIRTAZAPINE 15 MG/1
15 TABLET, FILM COATED ORAL
Qty: 90 | Refills: 3 | Status: DISCONTINUED | COMMUNITY
Start: 2021-09-21 | End: 2023-01-24

## 2023-01-24 RX ORDER — MIRTAZAPINE 7.5 MG/1
7.5 TABLET, FILM COATED ORAL
Qty: 30 | Refills: 5 | Status: DISCONTINUED | COMMUNITY
Start: 2020-08-19 | End: 2023-01-24

## 2023-01-24 RX ORDER — ROSUVASTATIN CALCIUM 5 MG/1
5 TABLET, FILM COATED ORAL DAILY
Qty: 90 | Refills: 3 | Status: DISCONTINUED | COMMUNITY
Start: 2022-08-03 | End: 2023-01-24

## 2023-01-24 NOTE — ASSESSMENT
[FreeTextEntry1] : 1/24/23\par pt new today (20 mins late)\par is doing well\par gained 10 lbs\par had labs 2 months ago\par \par will check on pneumonia vaccines\par \par referred to Dr Garcia re bladder - frequent urinatiion

## 2023-01-24 NOTE — PHYSICAL EXAM
[Alert] : alert [Well Nourished] : well nourished [Healthy Appearing] : healthy appearing [No Acute Distress] : in no acute distress [Well Developed] : well developed [Normal Voice/Communication] : normal voice/communication [No Respiratory Distress] : no respiratory distress [No Acc Muscle Use] : no accessory muscle use [Respiration, Rhythm And Depth] : normal respiratory rhythm and effort [Auscultation Breath Sounds / Voice Sounds] : lungs were clear to auscultation bilaterally [Normal S1, S2] : normal S1 and S2 [Murmurs] : no murmurs [Heart Rate And Rhythm] : heart rate was normal and rhythm regular [Edema] : edema was not present [Normal] : no spinal tenderness [de-identified] : adelita [de-identified] : short   steps -  then got better

## 2023-01-24 NOTE — REVIEW OF SYSTEMS
[Recent Weight Gain (___ Lbs)] : recent [unfilled] ~Ulb weight gain [Limb Weakness] : limb weakness [Difficulty Walking] : difficulty walking [Negative] : ENT [de-identified] : short term memory loss, plays jeopardy -  uses walker [FreeTextEntry1] : frequnet urination - can be small quantities - no dysuria

## 2023-01-24 NOTE — HISTORY OF PRESENT ILLNESS
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

## 2023-01-30 ENCOUNTER — NON-APPOINTMENT (OUTPATIENT)
Age: 78
End: 2023-01-30

## 2023-02-25 ENCOUNTER — TRANSCRIPTION ENCOUNTER (OUTPATIENT)
Age: 78
End: 2023-02-25

## 2023-02-27 ENCOUNTER — NON-APPOINTMENT (OUTPATIENT)
Age: 78
End: 2023-02-27

## 2023-04-12 ENCOUNTER — APPOINTMENT (OUTPATIENT)
Dept: NEUROSURGERY | Facility: CLINIC | Age: 78
End: 2023-04-12

## 2023-04-17 ENCOUNTER — APPOINTMENT (OUTPATIENT)
Dept: INTERNAL MEDICINE | Facility: CLINIC | Age: 78
End: 2023-04-17

## 2023-05-16 ENCOUNTER — APPOINTMENT (OUTPATIENT)
Dept: FAMILY MEDICINE | Facility: CLINIC | Age: 78
End: 2023-05-16

## 2023-05-17 ENCOUNTER — NON-APPOINTMENT (OUTPATIENT)
Age: 78
End: 2023-05-17

## 2023-05-18 ENCOUNTER — APPOINTMENT (OUTPATIENT)
Dept: NEUROLOGY | Facility: CLINIC | Age: 78
End: 2023-05-18

## 2023-06-06 ENCOUNTER — APPOINTMENT (OUTPATIENT)
Dept: GERIATRICS | Facility: CLINIC | Age: 78
End: 2023-06-06
Payer: MEDICARE

## 2023-06-06 VITALS
HEART RATE: 103 BPM | SYSTOLIC BLOOD PRESSURE: 108 MMHG | OXYGEN SATURATION: 98 % | BODY MASS INDEX: 18.74 KG/M2 | WEIGHT: 99.2 LBS | DIASTOLIC BLOOD PRESSURE: 72 MMHG | TEMPERATURE: 98 F

## 2023-06-06 DIAGNOSIS — G47.00 INSOMNIA, UNSPECIFIED: ICD-10-CM

## 2023-06-06 PROCEDURE — 99214 OFFICE O/P EST MOD 30 MIN: CPT

## 2023-06-06 RX ORDER — OMEPRAZOLE 20 MG/1
20 CAPSULE, DELAYED RELEASE ORAL DAILY
Qty: 90 | Refills: 3 | Status: DISCONTINUED | COMMUNITY
Start: 2022-11-23 | End: 2023-06-06

## 2023-06-06 RX ORDER — HYDROXYCHLOROQUINE SULFATE 200 MG/1
200 TABLET, FILM COATED ORAL
Qty: 30 | Refills: 0 | Status: DISCONTINUED | COMMUNITY
End: 2023-06-06

## 2023-06-06 RX ORDER — ROSUVASTATIN CALCIUM 5 MG/1
5 TABLET, FILM COATED ORAL DAILY
Qty: 90 | Refills: 3 | Status: DISCONTINUED | COMMUNITY
Start: 2020-11-16 | End: 2023-06-06

## 2023-06-06 RX ORDER — OMEPRAZOLE 20 MG/1
20 CAPSULE, DELAYED RELEASE ORAL TWICE DAILY
Qty: 180 | Refills: 3 | Status: DISCONTINUED | COMMUNITY
Start: 2022-11-21 | End: 2023-06-06

## 2023-06-06 NOTE — REVIEW OF SYSTEMS
[Recent Weight Gain (___ Lbs)] : recent [unfilled] ~Ulb weight gain [Limb Weakness] : limb weakness [Difficulty Walking] : difficulty walking [Negative] : ENT [de-identified] : short term memory loss, plays jeopardy -  uses walker [FreeTextEntry1] : frequnet urination - can be small quantities - no dysuria

## 2023-06-06 NOTE — PHYSICAL EXAM
[Alert] : alert [Well Nourished] : well nourished [Healthy Appearing] : healthy appearing [Well Developed] : well developed [Normal Voice/Communication] : normal voice/communication [No Respiratory Distress] : no respiratory distress [No Acc Muscle Use] : no accessory muscle use [Respiration, Rhythm And Depth] : normal respiratory rhythm and effort [Auscultation Breath Sounds / Voice Sounds] : lungs were clear to auscultation bilaterally [Normal S1, S2] : normal S1 and S2 [Murmurs] : no murmurs [Heart Rate And Rhythm] : heart rate was normal and rhythm regular [Edema] : edema was not present [Normal] : no spinal tenderness [de-identified] : frail, bruises on arms [de-identified] : adelita [de-identified] : short   steps -  then got better

## 2023-06-06 NOTE — ASSESSMENT
[FreeTextEntry1] : 1/24/23\par pt new today (20 mins late)\par is doing well\par gained 10 lbs\par had labs 2 months ago\par \par will check on pneumonia vaccines\par \par referred to Dr Garcia re bladder - frequent urinatiion\par \par 6/6/23\par hold bp meds (norvasc 5 mg)\par going to neuro re possible NPH\par increase zoloft 25 to 50 mg\par \par getting home PT - [facility]\par \par pain - management - renew ultram - tylenol\par \par \par urologist\par \par nexium\par gerd\par \par neuro - june 19 having scan\par \par \par \par

## 2023-06-07 LAB
ALBUMIN SERPL ELPH-MCNC: 4.5 G/DL
ALP BLD-CCNC: 143 U/L
ALT SERPL-CCNC: 15 U/L
ANION GAP SERPL CALC-SCNC: 16 MMOL/L
AST SERPL-CCNC: 22 U/L
BILIRUB SERPL-MCNC: 0.2 MG/DL
BUN SERPL-MCNC: 19 MG/DL
CALCIUM SERPL-MCNC: 10.3 MG/DL
CHLORIDE SERPL-SCNC: 102 MMOL/L
CHOLEST SERPL-MCNC: 126 MG/DL
CO2 SERPL-SCNC: 23 MMOL/L
CREAT SERPL-MCNC: 0.7 MG/DL
EGFR: 89 ML/MIN/1.73M2
GLUCOSE SERPL-MCNC: 114 MG/DL
HDLC SERPL-MCNC: 75 MG/DL
LDLC SERPL CALC-MCNC: 35 MG/DL
NONHDLC SERPL-MCNC: 51 MG/DL
POTASSIUM SERPL-SCNC: 3.7 MMOL/L
PROT SERPL-MCNC: 7.5 G/DL
SODIUM SERPL-SCNC: 141 MMOL/L
TRIGL SERPL-MCNC: 83 MG/DL

## 2023-06-08 ENCOUNTER — APPOINTMENT (OUTPATIENT)
Dept: PAIN MANAGEMENT | Facility: CLINIC | Age: 78
End: 2023-06-08
Payer: MEDICARE

## 2023-06-08 VITALS — WEIGHT: 99 LBS | BODY MASS INDEX: 18.69 KG/M2 | HEIGHT: 61 IN

## 2023-06-08 PROCEDURE — 99214 OFFICE O/P EST MOD 30 MIN: CPT

## 2023-06-08 NOTE — PHYSICAL EXAM
[de-identified] : Constitutional: Well-developed, in no acute distress\par \par Skin: Inspection normal, no bruising noted\par Musculoskeletal:\par Lumbar Spine:   Gait: Antalgic\par 		Inspection: Normal curvature, no abnormal kyphosis or scoliosis\par 		Facet loading: pain bilaterally\par 		Palpation:\par 			Lumbar and paraspinal muscles: pain bilaterally\par 			Sacroiliac joint: no pain\par 			Greater trochanter: no pain\par 		Muscle Strength:\par 		Iliopsoas: 5/5 bilaterally\par 		Quadriceps: 5/5 bilaterally\par 		Hamstrings: 5/5 bilaterally\par 		Tibialis anterior: 5/5 bilaterally\par 		Extensor hallucis longus: 5/5 bilaterally\par \par 		Sensation: normal and equal in bilateral lower extremities\par \par 		Reflexes:\par 			Patellar reflex: 2+ bilaterally\par 			Ankle jerk reflex: 2+ bilaterally\par 		\par 		Straight leg raise: negative bilaterally\par \par Extremity: no edema noted\par Neurological: Memory normal, AAO x 3, Cranial nerves II - XII grossly normal\par Psychiatric: Appropriate mood and affect, oriented to time, place, person, and situation\par \par \par

## 2023-06-08 NOTE — HISTORY OF PRESENT ILLNESS
[Back Pain] : back pain [Constant] : constant [Throbbing] : throbbing [Lifting] : lifting [Medications] : medications [Other: ___] : [unfilled] [3] : 1. What number best describes your pain on average in the past week? 3/10 pain [1] : 3. What number best describes how, during the past week, pain has interfered with your general activity? 1/10 pain [___ wks] : [unfilled] week(s) ago [7] : a maximum pain level of 7/10 [Sitting] : sitting [Bending] : bending [Rest] : rest [FreeTextEntry1] : Interval HIstory:\par Patient returns for follow-up. Her pain is severe. She has had multiple falls since her last visit. Most recent fall was at the end of April. No imaging since then. She can no longer stand due to the pain. Quality of life is impaired. There has been a severe exacerbation of the patient's chronic pain.\par \par As per my note dated 08/04/20: "74 yof presents w/ severe low back pain which began one week after falling down steps. Pain is axial and non-radiating. Using NSAIDS and acetaminophen w/ minimal relief. Pain is worsening. No relief w/ HEP."\par \par Pt returns for follow-up today, 08/27/20.  She reports that her pain is improving somewhat. She is doing PT. Using ibuprofen and acetaminophen. Here to discuss kyphoplasty. [FreeTextEntry2] : 4 [FreeTextEntry7] : Lower back  pain  [de-identified] : numbness, pins/needles

## 2023-06-08 NOTE — REVIEW OF SYSTEMS
[Back Pain] : back pain [Constipation] : constipation [Negative] : Musculoskeletal [FreeTextEntry2] : fatigue

## 2023-06-08 NOTE — ASSESSMENT
[FreeTextEntry1] : 77 yof w/ new onset severe back pain.\par \par I have personally reviewed the patient's MRI in detail and discussed it with them which is significant for old L1 VCF. She has had a severe fall after this was taken.\par \par The patient has failed to have relief with over six weeks of physical therapy within the last three months and all medications. GIven their failure to improve with all other conservative measures recommend MRI lumbar spine. Patient will return to review imaging and plan for potential intervention.\par \par Physical therapy prescribed - goal will be to increase ROM, strengthening, postural training, other modalities ad julienne which may include massage and stim. Goals of therapy discussed with the patient in detail and will be discussed with physical therapist. Patient will follow-up following course of physical therapy to monitor progress and adjust therapy as needed.\par \par Acetaminophen 1,000 mg q8h prn for moderate pain. Risks, benefits, and alternatives of acetaminophen discussed with patient.\par \par Ibuprofen 600 mg q8h prn add when pain is not adequately controlled with acetaminophen. Risks, benefits, and alternatives of ibuprofen discussed with patient.\par \par Diet and nutritional strategies discussed which may improve patients pain and will improve overall health.\par \par Plan for intervention based on imaging.

## 2023-06-23 ENCOUNTER — RESULT REVIEW (OUTPATIENT)
Age: 78
End: 2023-06-23

## 2023-06-29 ENCOUNTER — APPOINTMENT (OUTPATIENT)
Dept: GERIATRICS | Facility: HOME HEALTH | Age: 78
End: 2023-06-29
Payer: MEDICARE

## 2023-06-29 ENCOUNTER — APPOINTMENT (OUTPATIENT)
Dept: PAIN MANAGEMENT | Facility: CLINIC | Age: 78
End: 2023-06-29
Payer: MEDICARE

## 2023-06-29 VITALS
SYSTOLIC BLOOD PRESSURE: 120 MMHG | OXYGEN SATURATION: 98 % | TEMPERATURE: 97.4 F | HEART RATE: 96 BPM | DIASTOLIC BLOOD PRESSURE: 70 MMHG

## 2023-06-29 VITALS — HEART RATE: 99 BPM | SYSTOLIC BLOOD PRESSURE: 130 MMHG | HEIGHT: 61 IN | DIASTOLIC BLOOD PRESSURE: 80 MMHG

## 2023-06-29 PROCEDURE — 99349 HOME/RES VST EST MOD MDM 40: CPT

## 2023-06-29 PROCEDURE — 99214 OFFICE O/P EST MOD 30 MIN: CPT

## 2023-06-29 NOTE — REVIEW OF SYSTEMS
[Constipation] : constipation [Back Pain] : back pain [Negative] : Heme/Lymph [FreeTextEntry2] : fatigue

## 2023-06-29 NOTE — ASSESSMENT
[FreeTextEntry1] : 1/24/23\par pt new today (20 mins late)\par is doing well\par gained 10 lbs\par had labs 2 months ago\par \par will check on pneumonia vaccines\par \par referred to Dr Garcia re bladder - frequent urinatiion\par \par 6/6/23\par hold bp meds (norvasc 5 mg)\par going to neuro re possible NPH\par increase zoloft 25 to 50 mg\par \par getting home PT - [facility]\par \par pain - management - renew ultram - tylenol\par \par \par urologist\par \par nexium\par gerd\par \par neuro - june 19 having scan\par \par 6/29/23\par pt with poor appetite, anxiety\par depression\par possible NPH - getting eval\par going to pain management today\par \par add mirtazpine 15 mg\par \par \par

## 2023-06-29 NOTE — PHYSICAL EXAM
[de-identified] : Constitutional: Well-developed, in no acute distress\par \par Skin: Inspection normal, no bruising noted\par Musculoskeletal:\par Lumbar Spine:   Gait: Antalgic\par 		Inspection: Normal curvature, no abnormal kyphosis or scoliosis\par 		Facet loading: pain bilaterally\par 		Palpation:\par 			Lumbar and paraspinal muscles: pain bilaterally\par 			Sacroiliac joint: no pain\par 			Greater trochanter: no pain\par 		Muscle Strength:\par 		Iliopsoas: 5/5 bilaterally\par 		Quadriceps: 5/5 bilaterally\par 		Hamstrings: 5/5 bilaterally\par 		Tibialis anterior: 5/5 bilaterally\par 		Extensor hallucis longus: 5/5 bilaterally\par \par 		Sensation: normal and equal in bilateral lower extremities\par \par 		Reflexes:\par 			Patellar reflex: 2+ bilaterally\par 			Ankle jerk reflex: 2+ bilaterally\par 		\par 		Straight leg raise: negative bilaterally\par \par Extremity: no edema noted\par Neurological: Memory normal, AAO x 3, Cranial nerves II - XII grossly normal\par Psychiatric: Appropriate mood and affect, oriented to time, place, person, and situation\par \par \par

## 2023-06-29 NOTE — HISTORY OF PRESENT ILLNESS
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\par \par had flu vaccines\par had five covid vacines\par thinks had pneumonia vaccines\par \par mild memory loss\par plays jeopardy\par knows end of Jan \par \par has two children\par \par \par afraid of SBO -eats soft food, 2 ensure a day\par \par frequent urinattion - afraid of being incontince\par \par 6/6/23\par here with  and aide\par pt s/p two UTIs and hosptitalizations\par [place] two months\par beiing evluated for possible NPH\par Dr Mitchell  neuro\par eating poorly\par cereal, tangerines\par last week diarrhea - no more\par ct showed neurogenic bladder\par often feels like   needs urinate\par low back pain - tried tramadol - not much help\par had injection in back -- better for about a month\par licocaine patch - not much help\par \par 6/29/23\par home visit\par had mri spine this week - compression fracture, spinal stenosis\par hardly eating\par dry lips\par constant urination\par not sleeping\par anxious\par scared\par \par had MRi - \par brain\par being w/up for NPH\par \par \par seeing akash today for back\par \par  restarted  norvasc 5 mg\par  \par

## 2023-06-29 NOTE — ASSESSMENT
[FreeTextEntry1] : 77 yof w/ new onset severe back pain here to review MRI.\par \par I have personally reviewed the patient's MRI in detail and discussed it with them which is significant for a new T12 vertebral compression fracture.\par \par The patient has failed to have relief with medication management. The patient has failed to have relief with more then six weeks of physical therapy within the last three months. Given the patients failure to improve with all other conservative measures, recommend T12 kyphoplasty with bone biopsy and insertion of bone cement under fluoroscopic guidance. The patient will follow-up with me in my office two weeks following intervention.\par \par I have discussed in detail with the patient that an interventional spine procedure is associated with potential risks. The procedure may include an injection of steroid and potentially other medications (local anesthetic and normal saline) into the epidural space or surrounding tissue of the spine. There are significant risks of this procedure which include and are not limited to infection, bleeding, worsening pain, dural puncture leading to post-dural puncture headache, nerve damage, spinal cord injury, paralysis, stroke, and death. There is a chance that the procedure does not improve their pain. There are risks associated with the steroid being absorbed into the body systemically. These include dysphoria, difficulty sleeping, mood swings, and personality changes. Pre-menopausal women may notice a regularity his in her menstrual cycle for 2-3 months following the injection. Steroids can specifically affect patients with hypertension, diabetes, and peptic ulcers. The procedure may cause a temporary increase in blood pressure and blood glucose, and may adversely affect a peptic ulcer. Other, more rare complications, including avascular necrosis of the joints, glaucoma, and osteoporosis. I have discussed the risks of the procedure at length with the patient, and the potential benefits of pain relief. I have offered alternatives to the procedure. All questions were answered. The patient and their family expressed understanding and wishes to proceed with the procedure.\par \par Physical therapy prescribed - goal will be to increase ROM, strengthening, postural training, other modalities ad julienne which may include massage and stim. Goals of therapy discussed with the patient in detail and will be discussed with physical therapist. Patient will follow-up following course of physical therapy to monitor progress and adjust therapy as needed.\par \par Acetaminophen 1,000 mg q8h prn for moderate pain. Risks, benefits, and alternatives of acetaminophen discussed with patient.\par \par Ibuprofen 600 mg q8h prn add when pain is not adequately controlled with acetaminophen. Risks, benefits, and alternatives of ibuprofen discussed with patient.\par \par Diet and nutritional strategies discussed which may improve patients pain and will improve overall health.\par \par Recommend significant osteoporosis management.

## 2023-06-29 NOTE — REVIEW OF SYSTEMS
[Limb Weakness] : limb weakness [Difficulty Walking] : difficulty walking [Negative] : ENT [Recent Weight Gain (___ Lbs)] : no recent weight gain [FreeTextEntry2] : confused [de-identified] : short term memory loss, plays jeopardy -  uses walker [FreeTextEntry1] : frequnet urination - can be small quantities - no dysuria

## 2023-06-29 NOTE — HISTORY OF PRESENT ILLNESS
[3] : 1. What number best describes your pain on average in the past week? 3/10 pain [1] : 3. What number best describes how, during the past week, pain has interfered with your general activity? 1/10 pain [Back Pain] : back pain [___ wks] : [unfilled] week(s) ago [Constant] : constant [7] : a maximum pain level of 7/10 [Throbbing] : throbbing [Sitting] : sitting [Bending] : bending [Lifting] : lifting [Medications] : medications [Rest] : rest [Other: ___] : [unfilled] [FreeTextEntry1] : Interval HIstory:\par Patient returns for follow-up. Her pain is severe. She has had multiple falls since her last visit. Most recent fall was at the end of April. Here to review MRI. No relief w/ any medications or PT. Family accompanying her and wishing to discuss kyphoplasty.\par \par As per my note dated 08/04/20: "74 yof presents w/ severe low back pain which began one week after falling down steps. Pain is axial and non-radiating. Using NSAIDS and acetaminophen w/ minimal relief. Pain is worsening. No relief w/ HEP."\par \par Pt returns for follow-up today, 08/27/20.  She reports that her pain is improving somewhat. She is doing PT. Using ibuprofen and acetaminophen. Here to discuss kyphoplasty. [FreeTextEntry2] : 4 [FreeTextEntry7] : Lower back  pain  [de-identified] : numbness, pins/needles

## 2023-06-29 NOTE — DATA REVIEWED
[FreeTextEntry1] : \par  MRI Report             Final\par \par No Documents Attached\par \par \par \par \par   [facility]\par                                          [address]\par                                    [place], [place]  [address]\par                                        Department of Radiology\par                                             [phone]\par \par \par Patient Name:      BECKI STINSON                Location:       [facility]\par Med Rec #:        [id number]                    Account #:      [id number]\par YOB: 1945                    Ordering:       Linwood Lizarraga MD\par Age: 77               Sex:    F                 Attending:      Linwood Lizarraga MD\par PCP:        Rabia Morales MD\par ______________________________________________________________________________________\par \par Exam Date:      06/23/23\par Exam:         MRI LUMBAR SPINE; MRI THORACIC SPINE\par \par Order#:       [id number]; [id number]\par \par \par \par CLINICAL INDICATION: Back pain\par \par  TECHNIQUE: MRI of the thoracic and lumbar spine was performed without intravenous contrast.\par \par  COMPARISON: 2/23/2023\par \par  FINDINGS:\par \par  THORACIC and LUMBAR SPINE:\par \par  There is a recent compression deformity of the T12 vertebral body resulting in approximately 30%\par loss of vertebral body height. There is no significant retropulsion or evidence for associated e\par pidural hematoma. There is no significant change in vertebral plana deformity of the L1 vertebral\par body with associated retropulsion of the posterior superior aspect of the vertebral body resulting\par in mild central canal narrowing. There is secondary mildly exaggerated thoracic kyphosis at this\par level. No additional compression deformity.\par \par  There is grade 1 anterolisthesis at L5-S1 and mild retrolisthesis at L2-L3, L3-L4 and L4-L5. The\par lumbar vertebral body heights are otherwise preserved. The marrow signal is unremarkable. The conus\par is at L1 and is normal in signal and morphology. The surrounding soft tissues are unremarkable.\par \par  There is no significant central canal or neural foraminal narrowing within the thoracic spine.\par \par  The findings at the individual lumbar levels are as follows:\par \par  L1-L2: No disc herniation, central canal or neural foraminal narrowing..\par \par  L2-L3: Small posterior disc bulge without significant central canal or neural foraminal narrowing..\par \par  L3-L4: Broad-based posterior disc bulge and mild facet arthropathy result in mild bilateral neural\par frontal narrowing but no significant central canal narrowing..\par \par  L4-L5: Broad-based posterior disc bulge and moderate bilateral facet arthropathy result in moderate\par bilateral neural foraminal narrowing but no significant central canal narrowing..\par \par  L5-S1: Grade 1 anterolisthesis. Broad-based posterior disc bulge and moderate bilateral facet\par arthropathy result in mild to moderate bilateral neural foraminal narrowing but no significant\par central canal narrowing..\par \par  Unchanged moderate-sized hiatus hernia.\par \par \par IMPRESSION:\par \par  There is a recent compression deformity of the T12 vertebral body resulting in approximately 30%\par loss of vertebral body height. There is no significant retropulsion or evidence for associated ep\par idural hematoma. No significant change in L1 compression deformity.\par \par  Multilevel discogenic degenerative disease and facet arthropathy of the lumbar spine, most\par pronounced at L4-L5 where there is moderate bilateral neural foraminal narrowing. Additional varying\par degrees of neural foraminal narrowing, as above. No significant central canal narrowing throughout\par the lumbar spine.\par

## 2023-06-29 NOTE — PHYSICAL EXAM
[Alert] : alert [Well Nourished] : well nourished [Healthy Appearing] : healthy appearing [No Acute Distress] : in no acute distress [Well Developed] : well developed [Normal Voice/Communication] : normal voice/communication [No Respiratory Distress] : no respiratory distress [No Acc Muscle Use] : no accessory muscle use [Respiration, Rhythm And Depth] : normal respiratory rhythm and effort [Auscultation Breath Sounds / Voice Sounds] : lungs were clear to auscultation bilaterally [Normal S1, S2] : normal S1 and S2 [Murmurs] : no murmurs [Heart Rate And Rhythm] : heart rate was normal and rhythm regular [Edema] : edema was not present [Normal] : no spinal tenderness [de-identified] : frail, bruises on arms, lyig on couch - asking to sleep [de-identified] : adelita [de-identified] : short   steps -  then got better

## 2023-07-05 ENCOUNTER — RESULT REVIEW (OUTPATIENT)
Age: 78
End: 2023-07-05

## 2023-07-06 ENCOUNTER — TRANSCRIPTION ENCOUNTER (OUTPATIENT)
Age: 78
End: 2023-07-06

## 2023-07-06 ENCOUNTER — APPOINTMENT (OUTPATIENT)
Dept: PAIN MANAGEMENT | Facility: HOSPITAL | Age: 78
End: 2023-07-06

## 2023-07-13 ENCOUNTER — APPOINTMENT (OUTPATIENT)
Dept: GERIATRICS | Facility: HOME HEALTH | Age: 78
End: 2023-07-13
Payer: MEDICARE

## 2023-07-13 VITALS
SYSTOLIC BLOOD PRESSURE: 125 MMHG | BODY MASS INDEX: 18.71 KG/M2 | TEMPERATURE: 95.5 F | HEART RATE: 105 BPM | DIASTOLIC BLOOD PRESSURE: 75 MMHG | WEIGHT: 99 LBS | OXYGEN SATURATION: 97 %

## 2023-07-13 DIAGNOSIS — Z00.00 ENCOUNTER FOR GENERAL ADULT MEDICAL EXAMINATION W/OUT ABNORMAL FINDINGS: ICD-10-CM

## 2023-07-13 PROCEDURE — 99349 HOME/RES VST EST MOD MDM 40: CPT

## 2023-07-13 NOTE — PHYSICAL EXAM
[Alert] : alert [Well Nourished] : well nourished [Healthy Appearing] : healthy appearing [Well Developed] : well developed [Normal Voice/Communication] : normal voice/communication [No Respiratory Distress] : no respiratory distress [No Acc Muscle Use] : no accessory muscle use [Respiration, Rhythm And Depth] : normal respiratory rhythm and effort [Auscultation Breath Sounds / Voice Sounds] : lungs were clear to auscultation bilaterally [Normal S1, S2] : normal S1 and S2 [Murmurs] : no murmurs [Heart Rate And Rhythm] : heart rate was normal and rhythm regular [Edema] : edema was not present [Normal] : no spinal tenderness [de-identified] : frail, bruises on arms, lyig on couch - asking for help [de-identified] : adelita [de-identified] : short   steps -  then got better

## 2023-07-13 NOTE — ASSESSMENT
[FreeTextEntry1] : 1/24/23\par pt new today (20 mins late)\par is doing well\par gained 10 lbs\par had labs 2 months ago\par \par will check on pneumonia vaccines\par \par referred to Dr Garcia re bladder - frequent urinatiion\par \par 6/6/23\par hold bp meds (norvasc 5 mg)\par going to neuro re possible NPH\par increase zoloft 25 to 50 mg\par \par getting home PT - [facility]\par \par pain - management - renew ultram - tylenol\par \par \par urologist\par \par nexium\par gerd\par \par neuro - june 19 having scan\par \par 6/29/23\par pt with poor appetite, anxiety\par depression\par possible NPH - getting eval\par going to pain management today\par \par add mirtazpine 15 mg\par \par 7/13/23\par remains v anxious\par weight is stable\par on mirtazapie 15\par will increase  zoloft to 100 mg\par needs neuro w/up possible NPH\par renew tramadol - takes this and tylenol\par to see  Dr Johns - possible epidural\par had kyphoplasty for compression fracture\par PT and OT\par \par \par

## 2023-07-13 NOTE — REVIEW OF SYSTEMS
[Limb Weakness] : limb weakness [Difficulty Walking] : difficulty walking [Negative] : ENT [Recent Weight Gain (___ Lbs)] : no recent weight gain [Heart Rate Is Fast] : fast heart rate [FreeTextEntry2] : confused, anxious [FreeTextEntry9] : low back pain  [de-identified] : short term memory loss, plays jeopardy -  uses walker [FreeTextEntry1] : frequnet urination - can be small quantities - no dysuria

## 2023-07-13 NOTE — HISTORY OF PRESENT ILLNESS
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\par \par had flu vaccines\par had five covid vacines\par thinks had pneumonia vaccines\par \par mild memory loss\par plays jeopardy\par knows end of Jan \par \par has two children\par \par \par afraid of SBO -eats soft food, 2 ensure a day\par \par frequent urinattion - afraid of being incontince\par \par 6/6/23\par here with  and aide\par pt s/p two UTIs and hosptitalizations\par [place] two months\par beiing evluated for possible NPH\par Dr Mitchell  neuro\par eating poorly\par cereal, tangerines\par last week diarrhea - no more\par ct showed neurogenic bladder\par often feels like   needs urinate\par low back pain - tried tramadol - not much help\par had injection in back -- better for about a month\par licocaine patch - not much help\par \par 6/29/23\par home visit\par had mri spine this week - compression fracture, spinal stenosis\par hardly eating\par dry lips\par constant urination\par not sleeping\par anxious\par scared\par \par had MRi - \par brain\par being w/up for NPH\par \par \par seeing akash today for back\par \par  restarted  norvasc 5 mg\par  \par 7/13/23\par f/up\par had kyphoplasty 6/29/23 - may have helped a little\par did mot get neuro exam - appt got rescheduled \par started mirtazpine 2 weeks ago\par just restless\par getting less reflux on  nexium\par back hurts\par able to stand with walker\par remains v anxious\par Help  me God\par

## 2023-07-27 ENCOUNTER — APPOINTMENT (OUTPATIENT)
Dept: PAIN MANAGEMENT | Facility: CLINIC | Age: 78
End: 2023-07-27
Payer: MEDICARE

## 2023-07-27 VITALS
WEIGHT: 99 LBS | HEIGHT: 61 IN | DIASTOLIC BLOOD PRESSURE: 76 MMHG | BODY MASS INDEX: 18.69 KG/M2 | SYSTOLIC BLOOD PRESSURE: 115 MMHG

## 2023-07-27 PROCEDURE — 99214 OFFICE O/P EST MOD 30 MIN: CPT

## 2023-07-28 NOTE — HISTORY OF PRESENT ILLNESS
[3] : 1. What number best describes your pain on average in the past week? 3/10 pain [1] : 3. What number best describes how, during the past week, pain has interfered with your general activity? 1/10 pain [Back Pain] : back pain [___ wks] : [unfilled] week(s) ago [Constant] : constant [7] : a maximum pain level of 7/10 [Throbbing] : throbbing [Sitting] : sitting [Bending] : bending [Lifting] : lifting [Medications] : medications [Rest] : rest [Other: ___] : [unfilled] [FreeTextEntry1] : Interval HIstory:\par Patient returns for follow-up s/p T12 kyphoplasty with improvement in pain. Her previous mid back pain remains. Pain is worst in the morning and when she is being changed. \par \par As per my note dated 08/04/20: "74 yof presents w/ severe low back pain which began one week after falling down steps. Pain is axial and non-radiating. Using NSAIDS and acetaminophen w/ minimal relief. Pain is worsening. No relief w/ HEP."\par \par Pt returns for follow-up today, 08/27/20.  She reports that her pain is improving somewhat. She is doing PT. Using ibuprofen and acetaminophen. Here to discuss kyphoplasty.\par \par Interventions:\par T12 kyphoplasty (07/06/23): [FreeTextEntry2] : 4 [FreeTextEntry7] : Lower back  pain  [de-identified] : numbness, pins/needles

## 2023-07-28 NOTE — ASSESSMENT
[FreeTextEntry1] : 77 yof w/ new onset severe back pain here s/p kyphoplasty with relief of mid back pain.\par \par I have personally reviewed the patient's MRI in detail and discussed it with them which is significant for spinal stenosis.\par \par The patient has failed to have relief with medication management. The patient has failed to have relief with more then six weeks of physical therapy within the last three months. Given the patients failure to improve with all other conservative measures, recommend caudal epidural steroid injection under fluoroscopic guidance. The patient will follow-up with me in my office two weeks following intervention.\par \par I have discussed in detail with the patient that an interventional spine procedure is associated with potential risks. The procedure may include an injection of steroid and potentially other medications (local anesthetic and normal saline) into the epidural space or surrounding tissue of the spine. There are significant risks of this procedure which include and are not limited to infection, bleeding, worsening pain, dural puncture leading to post-dural puncture headache, nerve damage, spinal cord injury, paralysis, stroke, and death. There is a chance that the procedure does not improve their pain. There are risks associated with the steroid being absorbed into the body systemically. These include dysphoria, difficulty sleeping, mood swings, and personality changes. Pre-menopausal women may notice a regularity his in her menstrual cycle for 2-3 months following the injection. Steroids can specifically affect patients with hypertension, diabetes, and peptic ulcers. The procedure may cause a temporary increase in blood pressure and blood glucose, and may adversely affect a peptic ulcer. Other, more rare complications, including avascular necrosis of the joints, glaucoma, and osteoporosis. I have discussed the risks of the procedure at length with the patient, and the potential benefits of pain relief. I have offered alternatives to the procedure. All questions were answered. The patient expressed understanding and wishes to proceed with the procedure.\par \par Physical therapy prescribed - goal will be to increase ROM, strengthening, postural training, other modalities ad julienne which may include massage and stim. Goals of therapy discussed with the patient in detail and will be discussed with physical therapist. Patient will follow-up following course of physical therapy to monitor progress and adjust therapy as needed.\par \par Acetaminophen 1,000 mg q8h prn for moderate pain. Risks, benefits, and alternatives of acetaminophen discussed with patient.\par \par Ibuprofen 600 mg q8h prn add when pain is not adequately controlled with acetaminophen. Risks, benefits, and alternatives of ibuprofen discussed with patient.\par \par Diet and nutritional strategies discussed which may improve patients pain and will improve overall health.\par \par Recommend significant osteoporosis management.

## 2023-07-28 NOTE — DATA REVIEWED
[FreeTextEntry1] : 06/23/23\par Exam:         MRI LUMBAR SPINE; MRI THORACIC SPINE\par \par  THORACIC and LUMBAR SPINE:\par \par  There is a recent compression deformity of the T12 vertebral body resulting in approximately 30%\par loss of vertebral body height. There is no significant retropulsion or evidence for associated e\par pidural hematoma. There is no significant change in vertebral plana deformity of the L1 vertebral\par body with associated retropulsion of the posterior superior aspect of the vertebral body resulting\par in mild central canal narrowing. There is secondary mildly exaggerated thoracic kyphosis at this\par level. No additional compression deformity.\par \par  There is grade 1 anterolisthesis at L5-S1 and mild retrolisthesis at L2-L3, L3-L4 and L4-L5. The\par lumbar vertebral body heights are otherwise preserved. The marrow signal is unremarkable. The conus\par is at L1 and is normal in signal and morphology. The surrounding soft tissues are unremarkable.\par \par  There is no significant central canal or neural foraminal narrowing within the thoracic spine.\par \par  The findings at the individual lumbar levels are as follows:\par \par  L1-L2: No disc herniation, central canal or neural foraminal narrowing..\par \par  L2-L3: Small posterior disc bulge without significant central canal or neural foraminal narrowing..\par \par  L3-L4: Broad-based posterior disc bulge and mild facet arthropathy result in mild bilateral neural\par frontal narrowing but no significant central canal narrowing..\par \par  L4-L5: Broad-based posterior disc bulge and moderate bilateral facet arthropathy result in moderate\par bilateral neural foraminal narrowing but no significant central canal narrowing..\par \par  L5-S1: Grade 1 anterolisthesis. Broad-based posterior disc bulge and moderate bilateral facet\par arthropathy result in mild to moderate bilateral neural foraminal narrowing but no significant\par central canal narrowing..\par \par  Unchanged moderate-sized hiatus hernia.\par \par \par IMPRESSION:\par \par  There is a recent compression deformity of the T12 vertebral body resulting in approximately 30%\par loss of vertebral body height. There is no significant retropulsion or evidence for associated ep\par idural hematoma. No significant change in L1 compression deformity.\par \par  Multilevel discogenic degenerative disease and facet arthropathy of the lumbar spine, most\par pronounced at L4-L5 where there is moderate bilateral neural foraminal narrowing. Additional varying\par degrees of neural foraminal narrowing, as above. No significant central canal narrowing throughout\par the lumbar spine.\par

## 2023-07-28 NOTE — PHYSICAL EXAM
[de-identified] : Constitutional: Well-developed, in no acute distress\par \par Skin: Inspection normal, no bruising noted\par Musculoskeletal:\par Lumbar Spine:   Gait: Antalgic\par 		Inspection: Normal curvature, no abnormal kyphosis or scoliosis\par 		Facet loading: pain bilaterally\par 		Palpation:\par 			Lumbar and paraspinal muscles: pain bilaterally\par 			Sacroiliac joint: no pain\par 			Greater trochanter: no pain\par 		Muscle Strength:\par 		Iliopsoas: 5/5 bilaterally\par 		Quadriceps: 5/5 bilaterally\par 		Hamstrings: 5/5 bilaterally\par 		Tibialis anterior: 5/5 bilaterally\par 		Extensor hallucis longus: 5/5 bilaterally\par \par 		Sensation: normal and equal in bilateral lower extremities\par \par 		Reflexes:\par 			Patellar reflex: 2+ bilaterally\par 			Ankle jerk reflex: 2+ bilaterally\par 		\par 		Straight leg raise: negative bilaterally\par \par Extremity: no edema noted\par Neurological: Memory normal, AAO x 3, Cranial nerves II - XII grossly normal\par Psychiatric: Appropriate mood and affect, oriented to time, place, person, and situation\par \par \par

## 2023-08-03 ENCOUNTER — APPOINTMENT (OUTPATIENT)
Dept: PAIN MANAGEMENT | Facility: HOSPITAL | Age: 78
End: 2023-08-03

## 2023-08-16 ENCOUNTER — APPOINTMENT (OUTPATIENT)
Dept: GERIATRICS | Facility: HOME HEALTH | Age: 78
End: 2023-08-16
Payer: MEDICARE

## 2023-08-16 VITALS
SYSTOLIC BLOOD PRESSURE: 125 MMHG | OXYGEN SATURATION: 98 % | DIASTOLIC BLOOD PRESSURE: 80 MMHG | TEMPERATURE: 97.1 F | HEART RATE: 98 BPM

## 2023-08-16 PROCEDURE — 99349 HOME/RES VST EST MOD MDM 40: CPT

## 2023-08-16 NOTE — PHYSICAL EXAM
[Alert] : alert [Well Nourished] : well nourished [Healthy Appearing] : healthy appearing [Well Developed] : well developed [Normal Voice/Communication] : normal voice/communication [No Respiratory Distress] : no respiratory distress [No Acc Muscle Use] : no accessory muscle use [Respiration, Rhythm And Depth] : normal respiratory rhythm and effort [Auscultation Breath Sounds / Voice Sounds] : lungs were clear to auscultation bilaterally [Normal S1, S2] : normal S1 and S2 [Murmurs] : no murmurs [Heart Rate And Rhythm] : heart rate was normal and rhythm regular [Edema] : edema was not present [Abdomen Tenderness] : non-tender [Abdomen Soft] : soft [Normal] : no spinal tenderness [de-identified] : frail, bruises on arms, lyig on couch - calmer, peeling skin on her amrs [de-identified] : adelita [de-identified] : short   steps -  then got better

## 2023-08-16 NOTE — REVIEW OF SYSTEMS
[Heart Rate Is Fast] : fast heart rate [Limb Weakness] : limb weakness [Difficulty Walking] : difficulty walking [Anxiety] : anxiety [Depression] : depression [Easy Bruising] : a tendency for easy bruising [Negative] : ENT [Recent Weight Gain (___ Lbs)] : no recent weight gain [FreeTextEntry2] : confused, anxious [FreeTextEntry9] : low back pain  [de-identified] : short term memory loss, plays jeopardy -  uses walker [de-identified] : skin excoriiated [FreeTextEntry1] : frequnet urination - can be small quantities - no dysuria

## 2023-08-16 NOTE — HISTORY OF PRESENT ILLNESS
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  had flu vaccines had five covid vacines thinks had pneumonia vaccines  mild memory loss plays jesumi knows end of Jesse   has two children   afraid of SBO -eats soft food, 2 ensure a day  frequent urinattion - afraid of being incontince  6/6/23 here with  and aide pt s/p two UTIs and hosptitalizations [place] two months beiing evluated for possible NPH Dr Mitchell  neuro eating poorly cereal, tangerines last week diarrhea - no more ct showed neurogenic bladder often feels like   needs urinate low back pain - tried tramadol - not much help had injection in back -- better for about a month licocaine patch - not much help  6/29/23 home visit had mri spine this week - compression fracture, spinal stenosis hardly eating dry lips constant urination not sleeping anxious scared  had MRi -  brain being w/up for NPH   seeing akash today for back   restarted  norvasc 5 mg   7/13/23 f/up had kyphoplasty 6/29/23 - may have helped a little did mot get neuro exam - appt got rescheduled  started mirtazpine 2 weeks ago just restless getting less reflux on  nexium back hurts able to stand with walker remains v anxious Help  me God  8/16/23 went to neuro diagnosed with FTD discussed shunt for possible NPH - decided against more likely FTD with parkinsons features has  full report had rash all over saw Dr Recinos prescribed triamcinoloe .1% hydroxychloroquine 200 mg qd today is better day - calmer sleeps well needs handicapped stephani

## 2023-08-16 NOTE — ASSESSMENT
[FreeTextEntry1] : 1/24/23 pt new today (20 mins late) is doing well gained 10 lbs had labs 2 months ago  will check on pneumonia vaccines  referred to Dr Jose wade bladder - frequent urinatiion  6/6/23 hold bp meds (norvasc 5 mg) going to neuro re possible NPH increase zoloft 25 to 50 mg  getting home PT - [facility]  pain - management - renew ultram - tylenol   urologist  nexium gerd  neuro - june 19 having scan  6/29/23 pt with poor appetite, anxiety depression possible NPH - getting eval going to pain management today  add mirtazpine 15 mg  7/13/23 remains v anxious weight is stable on mirtazapie 15 will increase  zoloft to 100 mg needs neuro w/up possible NPH renew tramadol - takes this and tylenol to see  Dr Johns - possible epidural had kyphoplasty for compression fracture PT and OT  8/16/23 likely FTD not NPH is calmer and more alert today able to tell me    wedding date ages of   children sign for handicapped tag skin better  try to get OT

## 2023-08-24 ENCOUNTER — TRANSCRIPTION ENCOUNTER (OUTPATIENT)
Age: 78
End: 2023-08-24

## 2023-08-24 ENCOUNTER — APPOINTMENT (OUTPATIENT)
Dept: PAIN MANAGEMENT | Facility: HOSPITAL | Age: 78
End: 2023-08-24

## 2023-08-30 ENCOUNTER — NON-APPOINTMENT (OUTPATIENT)
Age: 78
End: 2023-08-30

## 2023-08-30 ENCOUNTER — APPOINTMENT (OUTPATIENT)
Dept: CARDIOLOGY | Facility: CLINIC | Age: 78
End: 2023-08-30
Payer: MEDICARE

## 2023-08-30 VITALS
DIASTOLIC BLOOD PRESSURE: 82 MMHG | BODY MASS INDEX: 17.66 KG/M2 | HEIGHT: 62 IN | WEIGHT: 96 LBS | SYSTOLIC BLOOD PRESSURE: 126 MMHG

## 2023-08-30 VITALS — SYSTOLIC BLOOD PRESSURE: 150 MMHG | DIASTOLIC BLOOD PRESSURE: 78 MMHG

## 2023-08-30 VITALS — SYSTOLIC BLOOD PRESSURE: 134 MMHG | DIASTOLIC BLOOD PRESSURE: 72 MMHG

## 2023-08-30 DIAGNOSIS — N39.0 URINARY TRACT INFECTION, SITE NOT SPECIFIED: ICD-10-CM

## 2023-08-30 DIAGNOSIS — R63.6 UNDERWEIGHT: ICD-10-CM

## 2023-08-30 DIAGNOSIS — R42 DIZZINESS AND GIDDINESS: ICD-10-CM

## 2023-08-30 DIAGNOSIS — R00.0 TACHYCARDIA, UNSPECIFIED: ICD-10-CM

## 2023-08-30 DIAGNOSIS — I63.81 OTHER CEREBRAL INFARCTION DUE TO OCCLUSION OR STENOSIS OF SMALL ARTERY: ICD-10-CM

## 2023-08-30 PROCEDURE — 36415 COLL VENOUS BLD VENIPUNCTURE: CPT

## 2023-08-30 PROCEDURE — 93000 ELECTROCARDIOGRAM COMPLETE: CPT

## 2023-08-30 PROCEDURE — 99215 OFFICE O/P EST HI 40 MIN: CPT

## 2023-08-30 RX ORDER — MIRTAZAPINE 15 MG/1
15 TABLET, FILM COATED ORAL
Qty: 30 | Refills: 5 | Status: DISCONTINUED | COMMUNITY
Start: 2023-06-29 | End: 2023-08-30

## 2023-08-30 RX ORDER — SODIUM FLUORIDE 5 MG/G
1.1 GEL DENTAL
Qty: 112 | Refills: 0 | Status: DISCONTINUED | COMMUNITY
Start: 2022-05-24 | End: 2023-08-30

## 2023-08-30 RX ORDER — SERTRALINE 25 MG/1
25 TABLET, FILM COATED ORAL
Qty: 30 | Refills: 0 | Status: DISCONTINUED | COMMUNITY
Start: 2023-05-10 | End: 2023-08-30

## 2023-08-30 RX ORDER — SERTRALINE HYDROCHLORIDE 50 MG/1
50 TABLET, FILM COATED ORAL DAILY
Qty: 90 | Refills: 0 | Status: DISCONTINUED | COMMUNITY
Start: 2023-06-06 | End: 2023-08-30

## 2023-08-30 NOTE — HISTORY OF PRESENT ILLNESS
[FreeTextEntry1] : Ms Andre was  referred in 2020  by Dr Cooley for MRI findings of a tiny right cerebellar chronic infarction and right anterior thalamic chronic lacunar infarct.  She was diagnosed with frontotemporal dementia and depression at [facility].  We reviewed [facility] together there was an admission in February 20 the 25th 2023 for weakness she was found to be septic due to urinary tract infection was treated with fluids and antibiotics. She is weak with And is afraid to eat as she once had a small bowel obstruction. she denies chest pain, dyspnea, palpitations or syncope.

## 2023-08-30 NOTE — CARDIOLOGY SUMMARY
[de-identified] : ze 11/2020- rare ectopy [de-identified] : 12/2020 mild calcified focal plaque lica [___] : [unfilled] [LVEF ___%] : LVEF [unfilled]%

## 2023-08-31 LAB
ALBUMIN SERPL ELPH-MCNC: 4.3 G/DL
ALP BLD-CCNC: 143 U/L
ALT SERPL-CCNC: 42 U/L
ANION GAP SERPL CALC-SCNC: 17 MMOL/L
AST SERPL-CCNC: 49 U/L
BILIRUB SERPL-MCNC: 0.2 MG/DL
BUN SERPL-MCNC: 23 MG/DL
CALCIUM SERPL-MCNC: 9.6 MG/DL
CHLORIDE SERPL-SCNC: 101 MMOL/L
CO2 SERPL-SCNC: 20 MMOL/L
CREAT SERPL-MCNC: 0.64 MG/DL
EGFR: 91 ML/MIN/1.73M2
GLUCOSE SERPL-MCNC: 111 MG/DL
HCT VFR BLD CALC: 38 %
HGB BLD-MCNC: 11.8 G/DL
MCHC RBC-ENTMCNC: 31.1 GM/DL
MCHC RBC-ENTMCNC: 31.1 PG
MCV RBC AUTO: 100 FL
PLATELET # BLD AUTO: 406 K/UL
POTASSIUM SERPL-SCNC: 4.4 MMOL/L
PROT SERPL-MCNC: 7.8 G/DL
RBC # BLD: 3.8 M/UL
RBC # FLD: 15.6 %
SODIUM SERPL-SCNC: 138 MMOL/L
T4 SERPL-MCNC: 7 UG/DL
TSH SERPL-ACNC: 1.17 UIU/ML
WBC # FLD AUTO: 7.31 K/UL

## 2023-09-14 ENCOUNTER — APPOINTMENT (OUTPATIENT)
Dept: PAIN MANAGEMENT | Facility: CLINIC | Age: 78
End: 2023-09-14
Payer: MEDICARE

## 2023-09-14 VITALS
SYSTOLIC BLOOD PRESSURE: 116 MMHG | HEIGHT: 62 IN | WEIGHT: 96 LBS | DIASTOLIC BLOOD PRESSURE: 77 MMHG | BODY MASS INDEX: 17.66 KG/M2

## 2023-09-14 PROCEDURE — 99214 OFFICE O/P EST MOD 30 MIN: CPT

## 2023-09-19 ENCOUNTER — NON-APPOINTMENT (OUTPATIENT)
Age: 78
End: 2023-09-19

## 2023-09-26 NOTE — PHYSICAL EXAM
Patient notified and voices understanding.    [de-identified] : Constitutional: Well-developed, in no acute distress\par Eyes: Pupil- Right: normal, Left: normal\par Neck exam: Inspection normal\par Respiratory: Normal effort, speaking in full sentences\par Cardiovascular: Regular rate and rhythm\par Vascular: Dorsal pedis pulses normal and equal bilaterally\par Abdomen: Inspection normal, no distension\par Skin: Inspection normal, no bruising noted\par Musculoskeletal:\par Lumbar Spine:   Gait: Antalgic\par 		Inspection: Normal curvature, no abnormal kyphosis or scoliosis\par 		Facet loading: pain bilaterally\par 		Palpation:\par 			Lumbar and paraspinal muscles: pain bilaterally\par 			Sacroiliac joint: no pain\par 			Greater trochanter: no pain\par 		Muscle Strength:\par 		Iliopsoas: 5/5 bilaterally\par 		Quadriceps: 5/5 bilaterally\par 		Hamstrings: 5/5 bilaterally\par 		Tibialis anterior: 5/5 bilaterally\par 		Extensor hallucis longus: 5/5 bilaterally\par \par 		Sensation: normal and equal in bilateral lower extremities\par \par 		Reflexes:\par 			Patellar reflex: 2+ bilaterally\par 			Ankle jerk reflex: 2+ bilaterally\par 		\par 		Straight leg raise: negative bilaterally\par \par Extremity: no edema noted\par Neurological: Memory normal, AAO x 3, Cranial nerves II - XII grossly normal\par Psychiatric: Appropriate mood and affect, oriented to time, place, person, and situation\par \par \par

## 2023-10-01 PROBLEM — I63.81 LACUNAR INFARCTION: Status: ACTIVE | Noted: 2020-11-16

## 2023-10-03 ENCOUNTER — APPOINTMENT (OUTPATIENT)
Dept: PAIN MANAGEMENT | Facility: HOSPITAL | Age: 78
End: 2023-10-03

## 2023-10-03 ENCOUNTER — TRANSCRIPTION ENCOUNTER (OUTPATIENT)
Age: 78
End: 2023-10-03

## 2023-10-05 ENCOUNTER — APPOINTMENT (OUTPATIENT)
Dept: PAIN MANAGEMENT | Facility: CLINIC | Age: 78
End: 2023-10-05
Payer: MEDICARE

## 2023-10-05 PROCEDURE — 99443: CPT | Mod: 95

## 2023-11-01 ENCOUNTER — APPOINTMENT (OUTPATIENT)
Dept: CARDIOLOGY | Facility: CLINIC | Age: 78
End: 2023-11-01
Payer: MEDICARE

## 2023-11-01 ENCOUNTER — APPOINTMENT (OUTPATIENT)
Dept: GERIATRICS | Facility: HOME HEALTH | Age: 78
End: 2023-11-01
Payer: MEDICARE

## 2023-11-01 VITALS
HEART RATE: 97 BPM | TEMPERATURE: 96.6 F | OXYGEN SATURATION: 98 % | DIASTOLIC BLOOD PRESSURE: 80 MMHG | SYSTOLIC BLOOD PRESSURE: 140 MMHG

## 2023-11-01 DIAGNOSIS — H61.23 IMPACTED CERUMEN, BILATERAL: ICD-10-CM

## 2023-11-01 DIAGNOSIS — K21.9 GASTRO-ESOPHAGEAL REFLUX DISEASE W/OUT ESOPHAGITIS: ICD-10-CM

## 2023-11-01 DIAGNOSIS — R26.9 UNSPECIFIED ABNORMALITIES OF GAIT AND MOBILITY: ICD-10-CM

## 2023-11-01 PROCEDURE — 93306 TTE W/DOPPLER COMPLETE: CPT

## 2023-11-01 PROCEDURE — 99349 HOME/RES VST EST MOD MDM 40: CPT

## 2023-11-01 RX ORDER — MIRTAZAPINE 30 MG/1
30 TABLET, FILM COATED ORAL
Qty: 90 | Refills: 3 | Status: ACTIVE | COMMUNITY
Start: 2022-09-14 | End: 1900-01-01

## 2023-11-09 ENCOUNTER — APPOINTMENT (OUTPATIENT)
Dept: PAIN MANAGEMENT | Facility: HOSPITAL | Age: 78
End: 2023-11-09

## 2023-11-09 ENCOUNTER — TRANSCRIPTION ENCOUNTER (OUTPATIENT)
Age: 78
End: 2023-11-09

## 2023-11-15 ENCOUNTER — APPOINTMENT (OUTPATIENT)
Dept: PAIN MANAGEMENT | Facility: CLINIC | Age: 78
End: 2023-11-15
Payer: MEDICARE

## 2023-11-15 PROCEDURE — 99213 OFFICE O/P EST LOW 20 MIN: CPT | Mod: 95

## 2023-12-07 RX ORDER — LIDOCAINE 5% 700 MG/1
5 PATCH TOPICAL
Qty: 30 | Refills: 5 | Status: ACTIVE | COMMUNITY
Start: 2021-04-01 | End: 1900-01-01

## 2023-12-19 ENCOUNTER — APPOINTMENT (OUTPATIENT)
Dept: PAIN MANAGEMENT | Facility: HOSPITAL | Age: 78
End: 2023-12-19

## 2023-12-19 ENCOUNTER — TRANSCRIPTION ENCOUNTER (OUTPATIENT)
Age: 78
End: 2023-12-19

## 2024-01-03 ENCOUNTER — APPOINTMENT (OUTPATIENT)
Dept: PAIN MANAGEMENT | Facility: CLINIC | Age: 79
End: 2024-01-03
Payer: MEDICARE

## 2024-01-03 PROCEDURE — 99213 OFFICE O/P EST LOW 20 MIN: CPT

## 2024-01-03 NOTE — ASSESSMENT
[FreeTextEntry1] : 78 yof w/ new onset severe back pain here s/p kyphoplasty with relief of mid back pain but low back pain remains after DASHAWN. No relief after RFA.  Renal function is good, start Mobic 7.5 mg BID prn.  I have personally reviewed the patient's MRI in detail and discussed it with them which is significant for spinal stenosis.  RTC prn.  Ibuprofen 600 mg q8h prn add when pain is not adequately controlled with acetaminophen. Risks, benefits, and alternatives of ibuprofen discussed with patient.  Diet and nutritional strategies discussed which may improve patients pain and will improve overall health.  Recommend significant osteoporosis management.  I explained to patient benefits and limitation of TeleMedicine visits. Patient understands that limitations include inability to perform comprehensive physical exam, which may lead to potential diagnostic inconsistencies.  Patient understands that diagnosis and treatment may be limited by these inconsistencies and patient agrees to proceed with care plan

## 2024-01-03 NOTE — PHYSICAL EXAM
[de-identified] : Constitutional: Well-developed, in no acute distress\par  \par  Skin: Inspection normal, no bruising noted\par  Musculoskeletal:\par  Lumbar Spine:   Gait: Antalgic\par  		Inspection: Normal curvature, no abnormal kyphosis or scoliosis\par  		Facet loading: pain bilaterally\par  		Palpation:\par  			Lumbar and paraspinal muscles: pain bilaterally\par  			Sacroiliac joint: no pain\par  			Greater trochanter: no pain\par  		Muscle Strength:\par  		Iliopsoas: 5/5 bilaterally\par  		Quadriceps: 5/5 bilaterally\par  		Hamstrings: 5/5 bilaterally\par  		Tibialis anterior: 5/5 bilaterally\par  		Extensor hallucis longus: 5/5 bilaterally\par  \par  		Sensation: normal and equal in bilateral lower extremities\par  \par  		Reflexes:\par  			Patellar reflex: 2+ bilaterally\par  			Ankle jerk reflex: 2+ bilaterally\par  		\par  		Straight leg raise: negative bilaterally\par  \par  Extremity: no edema noted\par  Neurological: Memory normal, AAO x 3, Cranial nerves II - XII grossly normal\par  Psychiatric: Appropriate mood and affect, oriented to time, place, person, and situation\par  \par  \par

## 2024-01-03 NOTE — HISTORY OF PRESENT ILLNESS
[FreeTextEntry1] : Verbal consent was given by/on: Yudi Andre on 01/03/24  Patient location: Home  Physician location: Office  Reason for Telehealth visit: Back pain  No relief as of yet from lumbar medial branch RFA. Cognition is at baseline.  This service took place using a two way audio and visual platform. The patient and Dr. Lizarraga were both able to see each other and communicate through video. There were no barriers to communication. Greater then 50% of the time spent in the encounter involved counseling and coordination of care.   Time spent on visit: 30 minutes   As per my note dated 08/04/20: "74 yof presents w/ severe low back pain which began one week after falling down steps. Pain is axial and non-radiating. Using NSAIDS and acetaminophen w/ minimal relief. Pain is worsening. No relief w/ HEP."  Pt returns for follow-up today, 08/27/20.  She reports that her pain is improving somewhat. She is doing PT. Using ibuprofen and acetaminophen. Here to discuss kyphoplasty.  Interventions: Caudal DASHAWN (08/24/23): T12 kyphoplasty (07/06/23): [Back Pain] : back pain [___ wks] : [unfilled] week(s) ago [Constant] : constant [7] : a maximum pain level of 7/10 [Throbbing] : throbbing [Sitting] : sitting [Bending] : bending [Lifting] : lifting [Medications] : medications [Rest] : rest [Other: ___] : [unfilled] [FreeTextEntry7] : Lower back  pain  [de-identified] : numbness, pins/needles [3] : 1. What number best describes your pain on average in the past week? 3/10 pain [1] : 3. What number best describes how, during the past week, pain has interfered with your general activity? 1/10 pain [FreeTextEntry2] : 4

## 2024-01-18 ENCOUNTER — APPOINTMENT (OUTPATIENT)
Dept: GERIATRICS | Facility: HOME HEALTH | Age: 79
End: 2024-01-18
Payer: MEDICARE

## 2024-01-18 VITALS
OXYGEN SATURATION: 100 % | HEART RATE: 92 BPM | SYSTOLIC BLOOD PRESSURE: 132 MMHG | TEMPERATURE: 97.6 F | DIASTOLIC BLOOD PRESSURE: 70 MMHG

## 2024-01-18 PROCEDURE — 99349 HOME/RES VST EST MOD MDM 40: CPT

## 2024-01-18 RX ORDER — TRAMADOL HYDROCHLORIDE 50 MG/1
50 TABLET, COATED ORAL
Qty: 90 | Refills: 0 | Status: DISCONTINUED | COMMUNITY
Start: 2023-06-06 | End: 2024-01-18

## 2024-01-18 RX ORDER — SERTRALINE HYDROCHLORIDE 100 MG/1
100 TABLET, FILM COATED ORAL DAILY
Qty: 135 | Refills: 3 | Status: ACTIVE | COMMUNITY
Start: 2023-06-06 | End: 1900-01-01

## 2024-01-18 RX ORDER — ESOMEPRAZOLE MAGNESIUM 40 MG/1
40 CAPSULE, DELAYED RELEASE ORAL DAILY
Qty: 90 | Refills: 3 | Status: ACTIVE | COMMUNITY
Start: 2023-06-06 | End: 1900-01-01

## 2024-01-18 RX ORDER — TRAZODONE HYDROCHLORIDE 50 MG/1
50 TABLET ORAL
Qty: 180 | Refills: 3 | Status: ACTIVE | COMMUNITY
Start: 2023-06-06 | End: 1900-01-01

## 2024-01-18 RX ORDER — AMLODIPINE BESYLATE 5 MG/1
5 TABLET ORAL
Qty: 90 | Refills: 3 | Status: ACTIVE | COMMUNITY
Start: 2023-06-29 | End: 1900-01-01

## 2024-01-18 RX ORDER — LORAZEPAM 0.5 MG/1
0.5 TABLET ORAL
Qty: 30 | Refills: 0 | Status: DISCONTINUED | COMMUNITY
Start: 2023-05-11 | End: 2024-01-18

## 2024-01-18 NOTE — ASSESSMENT
[FreeTextEntry1] : increase zoloft to 150 increase trazodone to 100 nerve block did not help remew PT handicapped plate renewal

## 2024-01-18 NOTE — HISTORY OF PRESENT ILLNESS
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  had flu vaccines had five covid vacines thinks had pneumonia vaccines  mild memory loss plays jm knows end of [place]   has two children   afraid of SBO -eats soft food, 2 ensure a day  frequent urinattion - afraid of being incontince  6/6/23 here with  and aide pt s/p two UTIs and hosptitalizations [place] two months beiing evluated for possible NPH Dr Mitchell  neuro eating poorly cereal, tangerines last week diarrhea - no more ct showed neurogenic bladder often feels like   needs urinate low back pain - tried tramadol - not much help had injection in back -- better for about a month licocaine patch - not much help  6/29/23 home visit had mri spine this week - compression fracture, spinal stenosis hardly eating dry lips constant urination not sleeping anxious scared  had MRi -  brain being w/up for NPH   seeing akash today for back   restarted  norvasc 5 mg   7/13/23 f/up had kyphoplasty 6/29/23 - may have helped a little did mot get neuro exam - appt got rescheduled  started mirtazpine 2 weeks ago just restless getting less reflux on  nexium back hurts able to stand with walker remains v anxious Help  me God  8/16/23 went to neuro diagnosed with FTD discussed shunt for possible NPH - decided against more likely FTD with parkinsons features has  full report had rash all over saw Dr Recinos prescribed triamcinoloe .1% hydroxychloroquine 200 mg qd today is better day - calmer sleeps well needs handicapped tage  11/1/23 still very anxious back pains - to get another injection asks to get back to bed needs flu vaccine asks for ears to clean  1/18/24 seen with , aide and resident pt is anxious, restless meloxican added at pain management

## 2024-01-18 NOTE — REVIEW OF SYSTEMS
[Heart Rate Is Fast] : fast heart rate [Lower Ext Edema] : no lower extremity edema [Cough] : cough [Arthralgias] : arthralgias [Confused] : confusion [Limb Weakness] : limb weakness [Difficulty Walking] : difficulty walking [Anxiety] : anxiety [Depression] : depression [Change In Personality] : personality change [Negative] : Heme/Lymph [FreeTextEntry8] : wants to stand to urinate [FreeTextEntry9] : pain in left lower back

## 2024-01-18 NOTE — PHYSICAL EXAM
[Alert] : alert [Well Nourished] : well nourished [Healthy Appearing] : healthy appearing [Well Developed] : well developed [Normal Voice/Communication] : normal voice/communication [No Respiratory Distress] : no respiratory distress [No Acc Muscle Use] : no accessory muscle use [Respiration, Rhythm And Depth] : normal respiratory rhythm and effort [Auscultation Breath Sounds / Voice Sounds] : lungs were clear to auscultation bilaterally [Normal S1, S2] : normal S1 and S2 [Murmurs] : no murmurs [Heart Rate And Rhythm] : heart rate was normal and rhythm regular [Edema] : edema was not present [Abdomen Tenderness] : non-tender [Abdomen Soft] : soft [Normal] : no spinal tenderness [de-identified] : in zackery naylor, leans left , nathalia neck  cushion [de-identified] : adelita [de-identified] : umbilical hernia [de-identified] : t lower back pain [de-identified] : anxious

## 2024-01-20 ENCOUNTER — NON-APPOINTMENT (OUTPATIENT)
Age: 79
End: 2024-01-20

## 2024-01-24 ENCOUNTER — APPOINTMENT (OUTPATIENT)
Dept: PAIN MANAGEMENT | Facility: CLINIC | Age: 79
End: 2024-01-24
Payer: MEDICARE

## 2024-01-24 PROCEDURE — G2211 COMPLEX E/M VISIT ADD ON: CPT

## 2024-01-24 PROCEDURE — 99214 OFFICE O/P EST MOD 30 MIN: CPT

## 2024-01-24 NOTE — PHYSICAL EXAM
[de-identified] : Constitutional: Well-developed, in no acute distress\par  \par  Skin: Inspection normal, no bruising noted\par  Musculoskeletal:\par  Lumbar Spine:   Gait: Antalgic\par  		Inspection: Normal curvature, no abnormal kyphosis or scoliosis\par  		Facet loading: pain bilaterally\par  		Palpation:\par  			Lumbar and paraspinal muscles: pain bilaterally\par  			Sacroiliac joint: no pain\par  			Greater trochanter: no pain\par  		Muscle Strength:\par  		Iliopsoas: 5/5 bilaterally\par  		Quadriceps: 5/5 bilaterally\par  		Hamstrings: 5/5 bilaterally\par  		Tibialis anterior: 5/5 bilaterally\par  		Extensor hallucis longus: 5/5 bilaterally\par  \par  		Sensation: normal and equal in bilateral lower extremities\par  \par  		Reflexes:\par  			Patellar reflex: 2+ bilaterally\par  			Ankle jerk reflex: 2+ bilaterally\par  		\par  		Straight leg raise: negative bilaterally\par  \par  Extremity: no edema noted\par  Neurological: Memory normal, AAO x 3, Cranial nerves II - XII grossly normal\par  Psychiatric: Appropriate mood and affect, oriented to time, place, person, and situation\par  \par  \par

## 2024-01-24 NOTE — HISTORY OF PRESENT ILLNESS
[FreeTextEntry1] : Verbal consent was given by/on: Yudi Andre on 01/03/24  Patient location: Home  Physician location: Office  Reason for Telehealth visit: Back pain  No relief as of yet from lumbar medial branch RFA. Cognition is at baseline.  This service took place using a two way audio and visual platform. The patient and Dr. Lizarraga were both able to see each other and communicate through video. There were no barriers to communication. Greater then 50% of the time spent in the encounter involved counseling and coordination of care.   Time spent on visit: 30 minutes   As per my note dated 08/04/20: "74 yof presents w/ severe low back pain which began one week after falling down steps. Pain is axial and non-radiating. Using NSAIDS and acetaminophen w/ minimal relief. Pain is worsening. No relief w/ HEP."  Pt returns for follow-up today, 08/27/20.  She reports that her pain is improving somewhat. She is doing PT. Using ibuprofen and acetaminophen. Here to discuss kyphoplasty.  Interventions: Caudal DASHAWN (08/24/23): T12 kyphoplasty (07/06/23): [Back Pain] : back pain [___ wks] : [unfilled] week(s) ago [Constant] : constant [7] : a minimum pain level of 7/10 [Throbbing] : throbbing [Sitting] : sitting [Bending] : bending [Lifting] : lifting [Medications] : medications [Rest] : rest [Other: ___] : [unfilled] [FreeTextEntry7] : Lower back  pain  [de-identified] : numbness, pins/needles [3] : 1. What number best describes your pain on average in the past week? 3/10 pain [1] : 2. What number best describes how, during the past week, pain has interfered with your enjoyment of life? 1/10 pain [FreeTextEntry2] : 4

## 2024-02-14 ENCOUNTER — APPOINTMENT (OUTPATIENT)
Dept: PAIN MANAGEMENT | Facility: CLINIC | Age: 79
End: 2024-02-14
Payer: MEDICARE

## 2024-02-14 PROCEDURE — G2211 COMPLEX E/M VISIT ADD ON: CPT

## 2024-02-14 PROCEDURE — 99214 OFFICE O/P EST MOD 30 MIN: CPT

## 2024-02-14 NOTE — PHYSICAL EXAM
[de-identified] : Constitutional: Well-developed, in no acute distress  Psychiatric: Appropriate mood and affect, oriented to time, place, person, and situation

## 2024-02-14 NOTE — ASSESSMENT
[FreeTextEntry1] : 78 yof w/ new onset severe back pain here s/p kyphoplasty with relief of mid back pain but low back pain remains after DASHAWN. No relief after RFA. Some relief with Mobic.  Renal function is good, continue Mobic 7.5 mg BID prn.  I have personally reviewed the patient's MRI in detail and discussed it with them which is significant for spinal stenosis.  RTC prn.  Tizanidine 2mg q8h prn.  Diet and nutritional strategies discussed which may improve patients pain and will improve overall health.  Recommend significant osteoporosis management.  Based on the current evaluation and management, I will provide ongoing, longitudinal care for the complex painful condition described above. Patient is encouraged to reach out with any questions and/or concerns regarding their condition and care.  I explained to patient benefits and limitation of TeleMedicine visits. Patient understands that limitations include inability to perform comprehensive physical exam, which may lead to potential diagnostic inconsistencies.  Patient understands that diagnosis and treatment may be limited by these inconsistencies and patient agrees to proceed with care plan.  Based on the current evaluation and management, I will provide ongoing, longitudinal care for the complex painful condition described above. Patient is encouraged to reach out with any questions and/or concerns regarding their condition and care.

## 2024-02-14 NOTE — HISTORY OF PRESENT ILLNESS
[Back Pain] : back pain [___ wks] : [unfilled] week(s) ago [Constant] : constant [7] : a maximum pain level of 7/10 [Throbbing] : throbbing [Sitting] : sitting [Bending] : bending [Lifting] : lifting [Medications] : medications [Rest] : rest [Other: ___] : [unfilled] [3] : 1. What number best describes your pain on average in the past week? 3/10 pain [1] : 3. What number best describes how, during the past week, pain has interfered with your general activity? 1/10 pain [FreeTextEntry1] : Verbal consent was given by/on: Yudi Andre on 02/14/24  Patient location: Home  Physician location: Office  Reason for Telehealth visit: Back pain  No relief as of yet from lumbar medial branch RFA. Cognition is at baseline. Wishes to try new medication. Discussed with patient in detail. She was using meloxicam and tizandine. She is having some relief with it.   This service took place using a two way audio and visual platform. The patient and Dr. Lizarraga were both able to see each other and communicate through video. There were no barriers to communication. Greater then 50% of the time spent in the encounter involved counseling and coordination of care.   Time spent on visit: 30 minutes   As per my note dated 08/04/20: "74 yof presents w/ severe low back pain which began one week after falling down steps. Pain is axial and non-radiating. Using NSAIDS and acetaminophen w/ minimal relief. Pain is worsening. No relief w/ HEP."  Pt returns for follow-up today, 08/27/20.  She reports that her pain is improving somewhat. She is doing PT. Using ibuprofen and acetaminophen. Here to discuss kyphoplasty.  Interventions: Caudal DASHAWN (08/24/23): T12 kyphoplasty (07/06/23): [FreeTextEntry7] : Lower back  pain  [de-identified] : numbness, pins/needles [FreeTextEntry2] : 4

## 2024-03-13 ENCOUNTER — APPOINTMENT (OUTPATIENT)
Dept: GERIATRICS | Facility: HOME HEALTH | Age: 79
End: 2024-03-13
Payer: MEDICARE

## 2024-03-13 VITALS
DIASTOLIC BLOOD PRESSURE: 70 MMHG | OXYGEN SATURATION: 100 % | TEMPERATURE: 97.8 F | HEART RATE: 95 BPM | SYSTOLIC BLOOD PRESSURE: 130 MMHG

## 2024-03-13 DIAGNOSIS — Z87.898 PERSONAL HISTORY OF OTHER SPECIFIED CONDITIONS: ICD-10-CM

## 2024-03-13 DIAGNOSIS — B37.31 ACUTE CANDIDIASIS OF VULVA AND VAGINA: ICD-10-CM

## 2024-03-13 DIAGNOSIS — R26.89 OTHER ABNORMALITIES OF GAIT AND MOBILITY: ICD-10-CM

## 2024-03-13 PROCEDURE — 99349 HOME/RES VST EST MOD MDM 40: CPT

## 2024-03-13 NOTE — PHYSICAL EXAM
[Alert] : alert [Well Nourished] : well nourished [Healthy Appearing] : healthy appearing [Well Developed] : well developed [Normal Voice/Communication] : normal voice/communication [No Respiratory Distress] : no respiratory distress [No Acc Muscle Use] : no accessory muscle use [Respiration, Rhythm And Depth] : normal respiratory rhythm and effort [Auscultation Breath Sounds / Voice Sounds] : lungs were clear to auscultation bilaterally [Normal S1, S2] : normal S1 and S2 [Heart Rate And Rhythm] : heart rate was normal and rhythm regular [Edema] : edema was not present [Abdomen Tenderness] : non-tender [Abdomen Soft] : soft [Normal] : no spinal tenderness [de-identified] : [place] [facility] [place] [de-identified] : 2/6 systolic murmur [de-identified] : cerumen removed  both ears - small ammount [de-identified] : umbilical hernia [de-identified] : labia and groin area - errythema [de-identified] : redness  vagina [de-identified] : cannot walk unaided [de-identified] : anxious

## 2024-03-13 NOTE — REVIEW OF SYSTEMS
[Sore Throat] : sore throat [Cough] : cough [Incontinence] : incontinence [Arthralgias] : arthralgias [Confused] : confusion [Anxiety] : anxiety [Negative] : Heme/Lymph [FreeTextEntry2] : anxious, impatient [de-identified] : redness in groin -uses vaseline and desitin

## 2024-03-13 NOTE — HISTORY OF PRESENT ILLNESS
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  had flu vaccines had five covid vacines thinks had pneumonia vaccines  mild memory loss plays jm knows end of [place]   has two children   afraid of SBO -eats soft food, 2 ensure a day  frequent urinattion - afraid of being incontince  6/6/23 here with  and aide pt s/p two UTIs and hosptitalizations [place] two months beiing evluated for possible NPH Dr Mitchell  neuro eating poorly cereal, tangerines last week diarrhea - no more ct showed neurogenic bladder often feels like   needs urinate low back pain - tried tramadol - not much help had injection in back -- better for about a month licocaine patch - not much help  6/29/23 home visit had mri spine this week - compression fracture, spinal stenosis hardly eating dry lips constant urination not sleeping anxious scared  had MRi -  brain being w/up for NPH   seeing akash today for back   restarted  norvasc 5 mg   7/13/23 f/up had kyphoplasty 6/29/23 - may have helped a little did mot get neuro exam - appt got rescheduled  started mirtazpine 2 weeks ago just restless getting less reflux on  nexium back hurts able to stand with walker remains v anxious Help  me God  8/16/23 went to neuro diagnosed with FTD discussed shunt for possible NPH - decided against more likely FTD with parkinsons features has  full report had rash all over saw Dr Recinos prescribed triamcinoloe .1% hydroxychloroquine 200 mg qd today is better day - calmer sleeps well needs handicapped tage  11/1/23 still very anxious back pains - to get another injection asks to get back to bed needs flu vaccine asks for ears to clean  1/18/24 seen with , aide and resident pt is anxious, restless meloxican added at pain management  3/13/24 pt has been well using flexeril 25 tid for back pain - not sure if helps no ADR was able to stand on scale with assist - but did go to 113 seems to have gained weight has good appetite doing home PT red in groin throat hurt this am

## 2024-03-13 NOTE — ASSESSMENT
[FreeTextEntry1] : 1/24/23 pt new today (20 mins late) is doing well gained 10 lbs had labs 2 months ago  will check on pneumonia vaccines  referred to Dr Jose wade bladder - frequent urinatiion  6/6/23 hold bp meds (norvasc 5 mg) going to neuro re possible NPH increase zoloft 25 to 50 mg  getting home PT - [facility]  pain - management - renew ultram - tylenol   urologist  nexium gerd  neuro - june 19 having scan  6/29/23 pt with poor appetite, anxiety depression possible NPH - getting eval going to pain management today  add mirtazpine 15 mg  7/13/23 remains v anxious weight is stable on mirtazapie 15 will increase  zoloft to 100 mg needs neuro w/up possible NPH renew tramadol - takes this and tylenol to see  Dr Johns - possible epidural had kyphoplasty for compression fracture PT and OT  3/13/24 pt looks much better less anxious gained some weight ears cleaned clotrimazole vagina

## 2024-03-27 ENCOUNTER — APPOINTMENT (OUTPATIENT)
Dept: PAIN MANAGEMENT | Facility: CLINIC | Age: 79
End: 2024-03-27
Payer: MEDICARE

## 2024-03-27 DIAGNOSIS — M80.08XG AGE-RELATED OSTEOPOROSIS WITH CURRENT PATHOLOGICAL FRACTURE, VERTEBRA(E), SUBSEQUENT ENCOUNTER FOR FRACTURE WITH DELAYED HEALING: ICD-10-CM

## 2024-03-27 PROCEDURE — 99214 OFFICE O/P EST MOD 30 MIN: CPT

## 2024-03-27 PROCEDURE — G2211 COMPLEX E/M VISIT ADD ON: CPT

## 2024-03-29 PROBLEM — M80.08XG AGE-RELATED OSTEOPOROSIS WITH CURRENT PATHOLOGICAL FRACTURE, VERTEBRA(E), SUBSEQUENT ENCOUNTER FOR FRACTURE WITH DELAYED HEALING: Status: ACTIVE | Noted: 2020-08-27

## 2024-03-29 NOTE — PHYSICAL EXAM
[de-identified] : Constitutional: Well-developed, in no acute distress  Psychiatric: Appropriate mood and affect, oriented to time, place, person, and situation

## 2024-03-29 NOTE — HISTORY OF PRESENT ILLNESS
[Back Pain] : back pain [___ wks] : [unfilled] week(s) ago [Constant] : constant [7] : a maximum pain level of 7/10 [Throbbing] : throbbing [Sitting] : sitting [Bending] : bending [Lifting] : lifting [Medications] : medications [Rest] : rest [Other: ___] : [unfilled] [3] : 1. What number best describes your pain on average in the past week? 3/10 pain [1] : 3. What number best describes how, during the past week, pain has interfered with your general activity? 1/10 pain [FreeTextEntry1] : Verbal consent was given by/on: Yudi Andre on 03/27/24  Patient location: Home  Physician location: Office  Reason for Telehealth visit: Back pain  No relief as of yet from lumbar medial branch RFA. Cognition is at baseline. Wishes to try new medication. Discussed with patient in detail. She was using meloxicam and tizandine. She is having some relief with it. Wishes to consider different medications.  This service took place using a two way audio and visual platform. The patient and Dr. Lizarraga were both able to see each other and communicate through video. There were no barriers to communication. Greater then 50% of the time spent in the encounter involved counseling and coordination of care.   Time spent on visit: 30 minutes   As per my note dated 08/04/20: "74 yof presents w/ severe low back pain which began one week after falling down steps. Pain is axial and non-radiating. Using NSAIDS and acetaminophen w/ minimal relief. Pain is worsening. No relief w/ HEP."  Pt returns for follow-up today, 08/27/20.  She reports that her pain is improving somewhat. She is doing PT. Using ibuprofen and acetaminophen. Here to discuss kyphoplasty.  Interventions: Caudal DASHAWN (08/24/23): T12 kyphoplasty (07/06/23): [de-identified] : numbness, pins/needles [FreeTextEntry7] : Lower back  pain  [FreeTextEntry2] : 4

## 2024-03-29 NOTE — ASSESSMENT
[FreeTextEntry1] : 78 yof w/ new onset severe back pain here s/p kyphoplasty with relief of mid back pain but low back pain remains after DASHAWN. No relief after RFA. Some relief with Mobic.  Renal function is good, continue Mobic 7.5 mg BID prn.  I have personally reviewed the patient's MRI in detail and discussed it with them which is significant for spinal stenosis.  Pt to come to office to discuss low dose naltrexone.  RTC prn.  Tizanidine 2mg q8h prn.  Diet and nutritional strategies discussed which may improve patients pain and will improve overall health.  Recommend significant osteoporosis management.  Based on the current evaluation and management, I will provide ongoing, longitudinal care for the complex painful condition described above. Patient is encouraged to reach out with any questions and/or concerns regarding their condition and care.  I explained to patient benefits and limitation of TeleMedicine visits. Patient understands that limitations include inability to perform comprehensive physical exam, which may lead to potential diagnostic inconsistencies.  Patient understands that diagnosis and treatment may be limited by these inconsistencies and patient agrees to proceed with care plan.  Based on the current evaluation and management, I will provide ongoing, longitudinal care for the complex painful condition described above. Patient is encouraged to reach out with any questions and/or concerns regarding their condition and care.

## 2024-04-18 ENCOUNTER — APPOINTMENT (OUTPATIENT)
Dept: GERIATRICS | Facility: HOME HEALTH | Age: 79
End: 2024-04-18
Payer: MEDICARE

## 2024-04-18 VITALS
OXYGEN SATURATION: 93 % | DIASTOLIC BLOOD PRESSURE: 80 MMHG | HEART RATE: 89 BPM | TEMPERATURE: 98.7 F | SYSTOLIC BLOOD PRESSURE: 150 MMHG

## 2024-04-18 PROCEDURE — 99349 HOME/RES VST EST MOD MDM 40: CPT

## 2024-04-18 RX ORDER — CLOTRIMAZOLE 2 G/100G
2 CREAM VAGINAL AT BEDTIME
Qty: 1 | Refills: 3 | Status: DISCONTINUED | COMMUNITY
Start: 2024-03-13 | End: 2024-04-18

## 2024-04-18 RX ORDER — MELOXICAM 7.5 MG/1
7.5 TABLET ORAL
Qty: 60 | Refills: 0 | Status: DISCONTINUED | COMMUNITY
Start: 2024-01-03 | End: 2024-04-18

## 2024-04-18 RX ORDER — TIZANIDINE 2 MG/1
2 TABLET ORAL
Qty: 45 | Refills: 0 | Status: DISCONTINUED | COMMUNITY
Start: 2024-01-24 | End: 2024-04-18

## 2024-04-18 RX ORDER — TIZANIDINE 2 MG/1
2 TABLET ORAL
Qty: 90 | Refills: 1 | Status: DISCONTINUED | COMMUNITY
Start: 2024-02-14 | End: 2024-04-18

## 2024-04-18 NOTE — HISTORY OF PRESENT ILLNESS
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  had flu vaccines had five covid vacines thinks had pneumonia vaccines  mild memory loss plays jm knows end of [place]   has two children   afraid of SBO -eats soft food, 2 ensure a day  frequent urinattion - afraid of being incontince  6/6/23 here with  and aide pt s/p two UTIs and hosptitalizations [place] two months beiing evluated for possible NPH Dr Mitchell  neuro eating poorly cereal, tangerines last week diarrhea - no more ct showed neurogenic bladder often feels like   needs urinate low back pain - tried tramadol - not much help had injection in back -- better for about a month licocaine patch - not much help  6/29/23 home visit had mri spine this week - compression fracture, spinal stenosis hardly eating dry lips constant urination not sleeping anxious scared  had MRi -  brain being w/up for NPH   seeing akash today for back   restarted  norvasc 5 mg   7/13/23 f/up had kyphoplasty 6/29/23 - may have helped a little did mot get neuro exam - appt got rescheduled  started mirtazpine 2 weeks ago just restless getting less reflux on  nexium back hurts able to stand with walker remains v anxious Help  me God  8/16/23 went to neuro diagnosed with FTD discussed shunt for possible NPH - decided against more likely FTD with parkinsons features has  full report had rash all over saw Dr Recinos prescribed triamcinoloe .1% hydroxychloroquine 200 mg qd today is better day - calmer sleeps well needs handicapped tage  11/1/23 still very anxious back pains - to get another injection asks to get back to bed needs flu vaccine asks for ears to clean  1/18/24 seen with , aide and resident pt is anxious, restless meloxican added at pain management  3/13/24 pt has been well using flexeril 25 tid for back pain - not sure if helps no ADR was able to stand on scale with assist - but did go to 113 seems to have gained weight has good appetite doing home PT red in groin throat hurt this am  4/18/24 pt having back pain using alternating tylenol and advil one  going to see Dr Johns - considering medical mariju[place] walking bit better off meloxicam and muscle relaxants rash vagina - cleared with a and d

## 2024-04-18 NOTE — ASSESSMENT
[FreeTextEntry1] : 1/24/23 pt new today (20 mins late) is doing well gained 10 lbs had labs 2 months ago  will check on pneumonia vaccines  referred to Dr Jose wade bladder - frequent urinatiion  6/6/23 hold bp meds (norvasc 5 mg) going to neuro re possible NPH increase zoloft 25 to 50 mg  getting home PT - [facility]  pain - management - renew ultram - tylenol   urologist  nexium gerd  neuro - june 19 having scan  6/29/23 pt with poor appetite, anxiety depression possible NPH - getting eval going to pain management today  add mirtazpine 15 mg  7/13/23 remains v anxious weight is stable on mirtazapie 15 will increase  zoloft to 100 mg needs neuro w/up possible NPH renew tramadol - takes this and tylenol to see  Dr Johns - possible epidural had kyphoplasty for compression fracture PT and OT   7/18/24 incraese advil from one to two tabs script for OT cerumen removed

## 2024-04-18 NOTE — PHYSICAL EXAM
[Alert] : alert [Well Nourished] : well nourished [Healthy Appearing] : healthy appearing [Well Developed] : well developed [Normal Voice/Communication] : normal voice/communication [No Respiratory Distress] : no respiratory distress [No Acc Muscle Use] : no accessory muscle use [Respiration, Rhythm And Depth] : normal respiratory rhythm and effort [Auscultation Breath Sounds / Voice Sounds] : lungs were clear to auscultation bilaterally [Normal S1, S2] : normal S1 and S2 [Heart Rate And Rhythm] : heart rate was normal and rhythm regular [Edema] : edema was not present [Abdomen Tenderness] : non-tender [Abdomen Soft] : soft [Normal] : no spinal tenderness [de-identified] : in [facility] [place]  - me[place]na [de-identified] : cerumen removed  both ears - small ammount [de-identified] : 2/6 systolic murmur [de-identified] : umbilical hernia [de-identified] : redness cleared [de-identified] : redness  vagina [de-identified] : cannot walk unaided [de-identified] : anxious

## 2024-04-18 NOTE — REVIEW OF SYSTEMS
[As Noted in HPI] : as noted in HPI [Arthralgias] : arthralgias [Confused] : confusion [Limb Weakness] : limb weakness [Difficulty Walking] : difficulty walking [Anxiety] : anxiety [Negative] : Heme/Lymph [FreeTextEntry8] : frequent urination [FreeTextEntry9] : back pain on lower left

## 2024-04-22 ENCOUNTER — APPOINTMENT (OUTPATIENT)
Dept: PAIN MANAGEMENT | Facility: CLINIC | Age: 79
End: 2024-04-22
Payer: MEDICARE

## 2024-04-22 VITALS
WEIGHT: 96 LBS | SYSTOLIC BLOOD PRESSURE: 148 MMHG | DIASTOLIC BLOOD PRESSURE: 77 MMHG | BODY MASS INDEX: 17.66 KG/M2 | HEIGHT: 62 IN

## 2024-04-22 DIAGNOSIS — M47.817 SPONDYLOSIS W/OUT MYELOPATHY OR RADICULOPATHY, LUMBOSACRAL REGION: ICD-10-CM

## 2024-04-22 PROCEDURE — 99214 OFFICE O/P EST MOD 30 MIN: CPT

## 2024-04-22 PROCEDURE — G2211 COMPLEX E/M VISIT ADD ON: CPT

## 2024-04-23 PROBLEM — M47.817 LUMBOSACRAL SPONDYLOSIS WITHOUT MYELOPATHY: Status: ACTIVE | Noted: 2020-08-05

## 2024-04-23 NOTE — HISTORY OF PRESENT ILLNESS
[Back Pain] : back pain [___ wks] : [unfilled] week(s) ago [Constant] : constant [7] : a maximum pain level of 7/10 [Throbbing] : throbbing [Sitting] : sitting [Bending] : bending [Lifting] : lifting [Medications] : medications [Rest] : rest [Other: ___] : [unfilled] [3] : 1. What number best describes your pain on average in the past week? 3/10 pain [1] : 3. What number best describes how, during the past week, pain has interfered with your general activity? 1/10 pain [FreeTextEntry1] : Interval History: Pt returns with severe pain. Quality of life is impaired. There has been a severe exacerbation of the patient's chronic pain. Wishes to try new medications. No relief as of yet from lumbar medial branch RFA. Cognition is at baseline. Wishes to try new medication. Discussed with patient in detail. She was using meloxicam and tizandine. She is having some relief with it. Wishes to consider different medications.  As per my note dated 08/04/20: "74 yof presents w/ severe low back pain which began one week after falling down steps. Pain is axial and non-radiating. Using NSAIDS and acetaminophen w/ minimal relief. Pain is worsening. No relief w/ HEP."  Pt returns for follow-up today, 08/27/20.  She reports that her pain is improving somewhat. She is doing PT. Using ibuprofen and acetaminophen. Here to discuss kyphoplasty.  Interventions: Caudal DASHAWN (08/24/23): T12 kyphoplasty (07/06/23):  [FreeTextEntry7] : Lower back  pain  [de-identified] : numbness, pins/needles [FreeTextEntry2] : 4

## 2024-04-23 NOTE — PHYSICAL EXAM
[de-identified] : Constitutional: Well-developed, in no acute distress  Musculoskeletal: Lumbar Spine:   Gait: Antalgic 		Inspection: Normal curvature, no abnormal kyphosis or scoliosis 		Facet loading: pain bilaterally 		Palpation: 			Lumbar and paraspinal muscles: pain bilaterally 			Sacroiliac joint: no pain 			Greater trochanter: no pain 		Muscle Strength: 		Iliopsoas: 5/5 bilaterally 		Quadriceps: 5/5 bilaterally 		Hamstrings: 5/5 bilaterally 		Tibialis anterior: 5/5 bilaterally 		Extensor hallucis longus: 5/5 bilaterally  		Sensation: normal and equal in bilateral lower extremities  Extremity: no edema noted Neurological: Memory normal, AAO x 3, Cranial nerves II - XII grossly normal Psychiatric: Appropriate mood and affect, oriented to time, place, person, and situation

## 2024-04-23 NOTE — ASSESSMENT
[FreeTextEntry1] : 78 yof w/ new onset severe back pain here s/p kyphoplasty with relief of mid back pain but low back pain remains after DASHAWN. No relief after RFA. Some relief with Mobic.  Renal function is good, continue Mobic 7.5 mg BID prn.  I have personally reviewed the patient's MRI in detail and discussed it with them which is significant for spinal stenosis.  RTC prn.  Tizanidine 2mg q8h prn.  Diet and nutritional strategies discussed which may improve patients pain and will improve overall health.  Recommend significant osteoporosis management.  Based on the current evaluation and management, I will provide ongoing, longitudinal care for the complex painful condition described above. Patient is encouraged to reach out with any questions and/or concerns regarding their condition and care.  Patient evaluated for medical cannabis. Reviewed medical history and current medications. No FH or personal hx of schizophrenia. Without significant contraindications including severe psychiatric illness or severe cardiovascular illness. Treatment team feels that patient meets criteria for medical cannabis certification including Chronic Pain that degrades functional capabilities. Reviewed potential for adverse events and stressed not to drive, make important life-changing decisions, operate heavy machinery during treatment or mix with sedation medications Explained patient will need to report adverse events to provider. Encouraged patient to ask additional questions. Advised patient can also direct questions to personnel and pharmacist at the dispensary. Discussed certification process. All questions answered.       Certification process performed on [facility]   Patient directed to [facility] Website to find dispensary   I-stop reference number #: [id number]   Agreement for use of Controlled substance medication(s) completed

## 2024-05-13 ENCOUNTER — NON-APPOINTMENT (OUTPATIENT)
Age: 79
End: 2024-05-13

## 2024-06-12 ENCOUNTER — APPOINTMENT (OUTPATIENT)
Dept: GERIATRICS | Facility: HOME HEALTH | Age: 79
End: 2024-06-12
Payer: MEDICARE

## 2024-06-12 DIAGNOSIS — G31.09 OTHER FRONTOTEMPORAL DEMENTIA: ICD-10-CM

## 2024-06-12 DIAGNOSIS — G12.20 OTHER FRONTOTEMPORAL DEMENTIA: ICD-10-CM

## 2024-06-12 DIAGNOSIS — F02.80 OTHER FRONTOTEMPORAL DEMENTIA: ICD-10-CM

## 2024-06-12 DIAGNOSIS — G89.4 CHRONIC PAIN SYNDROME: ICD-10-CM

## 2024-06-12 DIAGNOSIS — F41.9 ANXIETY DISORDER, UNSPECIFIED: ICD-10-CM

## 2024-06-12 DIAGNOSIS — E78.5 HYPERLIPIDEMIA, UNSPECIFIED: ICD-10-CM

## 2024-06-12 DIAGNOSIS — G91.2 (IDIOPATHIC) NORMAL PRESSURE HYDROCEPHALUS: ICD-10-CM

## 2024-06-12 DIAGNOSIS — M48.061 SPINAL STENOSIS, LUMBAR REGION WITHOUT NEUROGENIC CLAUDICATION: ICD-10-CM

## 2024-06-12 DIAGNOSIS — I10 ESSENTIAL (PRIMARY) HYPERTENSION: ICD-10-CM

## 2024-06-12 PROCEDURE — 99349 HOME/RES VST EST MOD MDM 40: CPT

## 2024-06-12 RX ORDER — ATORVASTATIN CALCIUM 10 MG/1
10 TABLET, FILM COATED ORAL
Qty: 90 | Refills: 3 | Status: ACTIVE | COMMUNITY
Start: 2023-06-06 | End: 1900-01-01

## 2024-06-12 NOTE — HISTORY OF PRESENT ILLNESS
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 - many years ago  had flu vaccines had five covid vacines thinks had pneumonia vaccines  mild memory loss plays jm knows end of Jesse   has two children   afraid of SBO -eats soft food, 2 ensure a day  frequent urinattion - afraid of being incontince  6/6/23 here with  and aide pt s/p two UTIs and hosptitalizations [place] two months beiing evluated for possible NPH Dr Mitchell  neuro eating poorly cereal, tangerines last week diarrhea - no more ct showed neurogenic bladder often feels like   needs urinate low back pain - tried tramadol - not much help had injection in back -- better for about a month licocaine patch - not much help  6/29/23 home visit had mri spine this week - compression fracture, spinal stenosis hardly eating dry lips constant urination not sleeping anxious scared  had MRi -  brain being w/up for NPH   seeing akash today for back   restarted  norvasc 5 mg   7/13/23 f/up had kyphoplasty 6/29/23 - may have helped a little did mot get neuro exam - appt got rescheduled  started mirtazpine 2 weeks ago just restless getting less reflux on  nexium back hurts able to stand with walker remains v anxious Help  me God  8/16/23 went to neuro diagnosed with FTD discussed shunt for possible NPH - decided against more likely FTD with parkinsons features has  full report had rash all over saw Dr Recinos prescribed triamcinoloe .1% hydroxychloroquine 200 mg qd today is better day - calmer sleeps well needs handicapped tage  11/1/23 still very anxious back pains - to get another injection asks to get back to bed needs flu vaccine asks for ears to clean  1/18/24 seen with , aide and resident pt is anxious, restless meloxican added at pain management  3/13/24 pt has been well using flexeril 25 tid for back pain - not sure if helps no ADR was able to stand on scale with assist - but did go to 113 seems to have gained weight has good appetite doing home PT red in groin throat hurt this am  4/18/24 pt having back pain using alternating tylenol and advil one  going to see Dr Johns - considering medical [facility] walking bit better  6/12/24 pt is well using cannabis 5 mg bid thc has pills and balm helps pain in back less anxiety no patience but otherwise well  pt needs a hospital bed - needs to positioned in ways not possible with ordinary bed needs head of bed elevated to prevent  aspiration she has dementia and is at risk of aspiration needs to be re-postioned to prevent skin breakdown

## 2024-06-12 NOTE — REVIEW OF SYSTEMS
[Confused] : confusion [Anxiety] : anxiety [Negative] : Heme/Lymph [FreeTextEntry2] : eats well., sleeping well - 11 hours [FreeTextEntry1] : gets OT twice a week, exercises  with pedal machine

## 2024-06-12 NOTE — PHYSICAL EXAM
[Alert] : alert [Well Nourished] : well nourished [Healthy Appearing] : healthy appearing [Well Developed] : well developed [Normal Voice/Communication] : normal voice/communication [No Respiratory Distress] : no respiratory distress [No Acc Muscle Use] : no accessory muscle use [Respiration, Rhythm And Depth] : normal respiratory rhythm and effort [Auscultation Breath Sounds / Voice Sounds] : lungs were clear to auscultation bilaterally [Normal S1, S2] : normal S1 and S2 [Heart Rate And Rhythm] : heart rate was normal and rhythm regular [Edema] : edema was not present [Abdomen Tenderness] : non-tender [Abdomen Soft] : soft [Normal] : no spinal tenderness [de-identified] : in [facility] [place]  - meghna [de-identified] : cerumen removed  both ears - small ammount [de-identified] : 2/6 systolic murmur [de-identified] : umbilical hernia [de-identified] : redness cleared [de-identified] : redness  vagina [de-identified] : cannot walk unaided [de-identified] : anxious

## 2024-08-15 ENCOUNTER — APPOINTMENT (OUTPATIENT)
Dept: GERIATRICS | Facility: HOME HEALTH | Age: 79
End: 2024-08-15
Payer: MEDICARE

## 2024-08-15 VITALS — HEART RATE: 85 BPM | OXYGEN SATURATION: 96 % | DIASTOLIC BLOOD PRESSURE: 70 MMHG | SYSTOLIC BLOOD PRESSURE: 105 MMHG

## 2024-08-15 DIAGNOSIS — I10 ESSENTIAL (PRIMARY) HYPERTENSION: ICD-10-CM

## 2024-08-15 DIAGNOSIS — F02.80 OTHER FRONTOTEMPORAL DEMENTIA: ICD-10-CM

## 2024-08-15 DIAGNOSIS — B36.9 SUPERFICIAL MYCOSIS, UNSPECIFIED: ICD-10-CM

## 2024-08-15 DIAGNOSIS — G12.20 OTHER FRONTOTEMPORAL DEMENTIA: ICD-10-CM

## 2024-08-15 DIAGNOSIS — M48.061 SPINAL STENOSIS, LUMBAR REGION WITHOUT NEUROGENIC CLAUDICATION: ICD-10-CM

## 2024-08-15 DIAGNOSIS — F41.9 ANXIETY DISORDER, UNSPECIFIED: ICD-10-CM

## 2024-08-15 DIAGNOSIS — R63.6 UNDERWEIGHT: ICD-10-CM

## 2024-08-15 DIAGNOSIS — R26.9 UNSPECIFIED ABNORMALITIES OF GAIT AND MOBILITY: ICD-10-CM

## 2024-08-15 DIAGNOSIS — G91.2 (IDIOPATHIC) NORMAL PRESSURE HYDROCEPHALUS: ICD-10-CM

## 2024-08-15 DIAGNOSIS — G31.09 OTHER FRONTOTEMPORAL DEMENTIA: ICD-10-CM

## 2024-08-15 PROCEDURE — 99349 HOME/RES VST EST MOD MDM 40: CPT

## 2024-08-15 RX ORDER — CLOTRIMAZOLE AND BETAMETHASONE DIPROPIONATE 10; .5 MG/G; MG/G
1-0.05 CREAM TOPICAL TWICE DAILY
Qty: 1 | Refills: 3 | Status: ACTIVE | COMMUNITY
Start: 2024-08-15 | End: 1900-01-01

## 2024-08-15 NOTE — ASSESSMENT
[FreeTextEntry1] : 1/24/23 pt new today (20 mins late) is doing well gained 10 lbs had labs 2 months ago  will check on pneumonia vaccines  referred to Dr Jose wade bladder - frequent urinatiion  6/6/23 hold bp meds (norvasc 5 mg) going to neuro re possible NPH increase zoloft 25 to 50 mg  getting home PT - [facility]  pain - management - renew ultram - tylenol   urologist  nexium gerd  neuro - june 19 having scan  6/29/23 pt with poor appetite, anxiety depression possible NPH - getting eval going to pain management today  add mirtazpine 15 mg  7/13/23 remains v anxious weight is stable on mirtazapie 15 will increase  zoloft to 100 mg needs neuro w/up possible NPH renew tramadol - takes this and tylenol to see  Dr Johns - possible epidural had kyphoplasty for compression fracture PT and OT   7/18/24 incraese advil from one to two tabs script for OT cerumen removed   8/15/24 fungal rash in groin and under breasts -  lotrisone  BP controlled  has gotten home PT  anxiey better  cerumen removed bilaterally

## 2024-08-15 NOTE — PHYSICAL EXAM
[Alert] : alert [Well Nourished] : well nourished [Healthy Appearing] : healthy appearing [Well Developed] : well developed [Normal Voice/Communication] : normal voice/communication [No Respiratory Distress] : no respiratory distress [No Acc Muscle Use] : no accessory muscle use [Respiration, Rhythm And Depth] : normal respiratory rhythm and effort [Auscultation Breath Sounds / Voice Sounds] : lungs were clear to auscultation bilaterally [Normal S1, S2] : normal S1 and S2 [Heart Rate And Rhythm] : heart rate was normal and rhythm regular [Edema] : edema was not present [Abdomen Tenderness] : non-tender [Abdomen Soft] : soft [Normal] : no spinal tenderness [de-identified] : in [facility]  - meghna [de-identified] : 2/6 systolic murmur [de-identified] : rash under breasts, under abdomen pannus [de-identified] : umbilical hernia [de-identified] : redness  [de-identified] : redness  vagina [de-identified] : cannot walk unaided [de-identified] : anxious

## 2024-08-15 NOTE — HISTORY OF PRESENT ILLNESS
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  had flu vaccines had five covid vacines thinks had pneumonia vaccines  mild memory loss plays jm knows end of Jesse   has two children   afraid of SBO -eats soft food, 2 ensure a day  frequent urinattion - afraid of being incontince  6/6/23 here with  and aide pt s/p two UTIs and hosptitalizations [place] two months beiing evluated for possible NPH Dr Mitchell  neuro eating poorly cereal, tangerines last week diarrhea - no more ct showed neurogenic bladder often feels like   needs urinate low back pain - tried tramadol - not much help had injection in back -- better for about a month licocaine patch - not much help  6/29/23 home visit had mri spine this week - compression fracture, spinal stenosis hardly eating dry lips constant urination not sleeping anxious scared  had MRi -  brain being w/up for NPH   seeing akash today for back   restarted  norvasc 5 mg   7/13/23 f/up had kyphoplasty 6/29/23 - may have helped a little did mot get neuro exam - appt got rescheduled  started mirtazpine 2 weeks ago just restless getting less reflux on  nexium back hurts able to stand with walker remains v anxious Help  me God  8/16/23 went to neuro diagnosed with FTD discussed shunt for possible NPH - decided against more likely FTD with parkinsons features has  full report had rash all over saw Dr Recinos prescribed triamcinoloe .1% hydroxychloroquine 200 mg qd today is better day - calmer sleeps well needs handicapped tage  11/1/23 still very anxious back pains - to get another injection asks to get back to bed needs flu vaccine asks for ears to clean  1/18/24 seen with , aide and resident pt is anxious, restless meloxican added at pain management  3/13/24 pt has been well using flexeril 25 tid for back pain - not sure if helps no ADR was able to stand on scale with assist - but did go to 113 seems to have gained weight has good appetite doing home PT red in groin throat hurt this am  4/18/24 pt having back pain using alternating tylenol and advil one  going to see Dr Johns - considering medical [facility] walking bit better  6/12/24 pt is well using cannabis 5 mg bid thc has pills and balm helps pain in back less anxiety no patience but otherwise well  pt needs a hospital bed - needs to positioned in ways not possible with ordinary bed needs head of bed elevated to prevent  aspiration she has dementia and is at risk of aspiration needs to be re-postioned to prevent skin breakdown  8/15/24 pt has been well has been gaining some weight - unable to stand on scale redness in groin - triamcinolone and nystatin did not help clear rashes no dysuria uses vagasil, a and d and desitin

## 2024-08-15 NOTE — REVIEW OF SYSTEMS
[Incontinence] : incontinence [Arthralgias] : arthralgias [Confused] : confusion [Anxiety] : anxiety [Negative] : Heme/Lymph [FreeTextEntry9] : low back pain

## 2024-08-31 ENCOUNTER — NON-APPOINTMENT (OUTPATIENT)
Age: 79
End: 2024-08-31

## 2024-08-31 DIAGNOSIS — M48.061 SPINAL STENOSIS, LUMBAR REGION WITHOUT NEUROGENIC CLAUDICATION: ICD-10-CM

## 2024-08-31 DIAGNOSIS — B36.9 SUPERFICIAL MYCOSIS, UNSPECIFIED: ICD-10-CM

## 2024-08-31 DIAGNOSIS — G91.2 (IDIOPATHIC) NORMAL PRESSURE HYDROCEPHALUS: ICD-10-CM

## 2024-08-31 DIAGNOSIS — Z87.898 PERSONAL HISTORY OF OTHER SPECIFIED CONDITIONS: ICD-10-CM

## 2024-08-31 DIAGNOSIS — G47.00 INSOMNIA, UNSPECIFIED: ICD-10-CM

## 2024-08-31 DIAGNOSIS — M72.2 PLANTAR FASCIAL FIBROMATOSIS: ICD-10-CM

## 2024-08-31 DIAGNOSIS — H61.23 IMPACTED CERUMEN, BILATERAL: ICD-10-CM

## 2024-08-31 NOTE — CARDIOLOGY SUMMARY
[de-identified] : ze 11/2020- rare ectopy [de-identified] : 12/2020 mild calcified focal plaque lica [___] : [unfilled] [LVEF ___%] : LVEF [unfilled]%

## 2024-08-31 NOTE — HISTORY OF PRESENT ILLNESS
[FreeTextEntry1] : Ms Andre was  referred in 2020  by Dr Cooley for MRI findings of a tiny right cerebellar chronic infarction and right anterior thalamic chronic lacunar infarct.  She was diagnosed with frontotemporal dementia and depression at [facility]. There have been no hospitalizations since last visit. She denies chest pain, dyspnea, palpitations or syncope.

## 2024-09-04 ENCOUNTER — NON-APPOINTMENT (OUTPATIENT)
Age: 79
End: 2024-09-04

## 2024-09-04 ENCOUNTER — APPOINTMENT (OUTPATIENT)
Dept: CARDIOLOGY | Facility: CLINIC | Age: 79
End: 2024-09-04
Payer: MEDICARE

## 2024-09-04 VITALS
HEIGHT: 62 IN | DIASTOLIC BLOOD PRESSURE: 68 MMHG | SYSTOLIC BLOOD PRESSURE: 140 MMHG | WEIGHT: 122 LBS | BODY MASS INDEX: 22.45 KG/M2

## 2024-09-04 DIAGNOSIS — I63.81 OTHER CEREBRAL INFARCTION DUE TO OCCLUSION OR STENOSIS OF SMALL ARTERY: ICD-10-CM

## 2024-09-04 DIAGNOSIS — I10 ESSENTIAL (PRIMARY) HYPERTENSION: ICD-10-CM

## 2024-09-04 DIAGNOSIS — E78.5 HYPERLIPIDEMIA, UNSPECIFIED: ICD-10-CM

## 2024-09-04 DIAGNOSIS — Z86.79 PERSONAL HISTORY OF OTHER DISEASES OF THE CIRCULATORY SYSTEM: ICD-10-CM

## 2024-09-04 DIAGNOSIS — R42 DIZZINESS AND GIDDINESS: ICD-10-CM

## 2024-09-04 PROCEDURE — 99214 OFFICE O/P EST MOD 30 MIN: CPT

## 2024-09-04 PROCEDURE — 93000 ELECTROCARDIOGRAM COMPLETE: CPT

## 2024-09-04 RX ORDER — MIRTAZAPINE 15 MG/1
15 TABLET, FILM COATED ORAL
Refills: 0 | Status: ACTIVE | COMMUNITY
Start: 2024-09-04

## 2024-09-04 RX ORDER — ATORVASTATIN CALCIUM 10 MG/1
10 TABLET, FILM COATED ORAL DAILY
Qty: 90 | Refills: 3 | Status: ACTIVE | COMMUNITY
Start: 2024-09-04

## 2024-09-04 NOTE — HISTORY OF PRESENT ILLNESS
[FreeTextEntry1] : Ms Andre was  referred in 2020  by Dr Cooley for MRI findings of a tiny right cerebellar chronic infarction and right anterior thalamic chronic lacunar infarct.  She was diagnosed with frontotemporal dementia and depression at [facility]. There have been no hospitalizations since last visit. She denies chest pain, dyspnea, palpitations or syncope.  She is mainly in the wheelchair.  She does get home physical therapy.

## 2024-09-04 NOTE — CARDIOLOGY SUMMARY
[de-identified] : ze 11/2020- rare ectopy [de-identified] : 12/2020 mild calcified focal plaque lica

## 2024-09-17 ENCOUNTER — NON-APPOINTMENT (OUTPATIENT)
Age: 79
End: 2024-09-17

## 2024-10-17 ENCOUNTER — APPOINTMENT (OUTPATIENT)
Dept: GERIATRICS | Facility: HOME HEALTH | Age: 79
End: 2024-10-17
Payer: MEDICARE

## 2024-10-17 VITALS — SYSTOLIC BLOOD PRESSURE: 120 MMHG | HEART RATE: 97 BPM | DIASTOLIC BLOOD PRESSURE: 70 MMHG | OXYGEN SATURATION: 98 %

## 2024-10-17 DIAGNOSIS — F02.80 OTHER FRONTOTEMPORAL DEMENTIA: ICD-10-CM

## 2024-10-17 DIAGNOSIS — M48.061 SPINAL STENOSIS, LUMBAR REGION WITHOUT NEUROGENIC CLAUDICATION: ICD-10-CM

## 2024-10-17 DIAGNOSIS — B36.9 SUPERFICIAL MYCOSIS, UNSPECIFIED: ICD-10-CM

## 2024-10-17 DIAGNOSIS — G31.09 OTHER FRONTOTEMPORAL DEMENTIA: ICD-10-CM

## 2024-10-17 DIAGNOSIS — I10 ESSENTIAL (PRIMARY) HYPERTENSION: ICD-10-CM

## 2024-10-17 DIAGNOSIS — G12.20 OTHER FRONTOTEMPORAL DEMENTIA: ICD-10-CM

## 2024-10-17 DIAGNOSIS — F41.9 ANXIETY DISORDER, UNSPECIFIED: ICD-10-CM

## 2024-10-17 PROCEDURE — 99349 HOME/RES VST EST MOD MDM 40: CPT

## 2024-10-17 RX ORDER — NYSTATIN 100000 [USP'U]/G
100000 CREAM TOPICAL
Qty: 2 | Refills: 3 | Status: ACTIVE | COMMUNITY
Start: 2024-10-17 | End: 1900-01-01

## 2024-10-17 RX ORDER — NYSTATIN 100000 [USP'U]/G
100000 POWDER TOPICAL
Qty: 1 | Refills: 3 | Status: ACTIVE | COMMUNITY
Start: 2024-10-17 | End: 1900-01-01

## 2024-10-23 ENCOUNTER — RX RENEWAL (OUTPATIENT)
Age: 79
End: 2024-10-23

## 2024-12-12 ENCOUNTER — APPOINTMENT (OUTPATIENT)
Dept: GERIATRICS | Facility: HOME HEALTH | Age: 79
End: 2024-12-12
Payer: MEDICARE

## 2024-12-12 VITALS
TEMPERATURE: 97.7 F | OXYGEN SATURATION: 100 % | SYSTOLIC BLOOD PRESSURE: 120 MMHG | DIASTOLIC BLOOD PRESSURE: 70 MMHG | HEART RATE: 77 BPM

## 2024-12-12 DIAGNOSIS — G12.20 OTHER FRONTOTEMPORAL DEMENTIA: ICD-10-CM

## 2024-12-12 DIAGNOSIS — M80.08XG AGE-RELATED OSTEOPOROSIS WITH CURRENT PATHOLOGICAL FRACTURE, VERTEBRA(E), SUBSEQUENT ENCOUNTER FOR FRACTURE WITH DELAYED HEALING: ICD-10-CM

## 2024-12-12 DIAGNOSIS — M48.061 SPINAL STENOSIS, LUMBAR REGION WITHOUT NEUROGENIC CLAUDICATION: ICD-10-CM

## 2024-12-12 DIAGNOSIS — I10 ESSENTIAL (PRIMARY) HYPERTENSION: ICD-10-CM

## 2024-12-12 DIAGNOSIS — E78.5 HYPERLIPIDEMIA, UNSPECIFIED: ICD-10-CM

## 2024-12-12 DIAGNOSIS — R26.9 UNSPECIFIED ABNORMALITIES OF GAIT AND MOBILITY: ICD-10-CM

## 2024-12-12 DIAGNOSIS — F02.80 OTHER FRONTOTEMPORAL DEMENTIA: ICD-10-CM

## 2024-12-12 DIAGNOSIS — G31.09 OTHER FRONTOTEMPORAL DEMENTIA: ICD-10-CM

## 2024-12-12 PROCEDURE — 99349 HOME/RES VST EST MOD MDM 40: CPT

## 2025-02-03 ENCOUNTER — NON-APPOINTMENT (OUTPATIENT)
Age: 80
End: 2025-02-03

## 2025-03-19 ENCOUNTER — APPOINTMENT (OUTPATIENT)
Dept: GERIATRICS | Facility: HOME HEALTH | Age: 80
End: 2025-03-19
Payer: MEDICARE

## 2025-03-19 VITALS — OXYGEN SATURATION: 97 % | SYSTOLIC BLOOD PRESSURE: 120 MMHG | HEART RATE: 80 BPM | DIASTOLIC BLOOD PRESSURE: 65 MMHG

## 2025-03-19 DIAGNOSIS — F41.9 ANXIETY DISORDER, UNSPECIFIED: ICD-10-CM

## 2025-03-19 DIAGNOSIS — G31.09 OTHER FRONTOTEMPORAL DEMENTIA: ICD-10-CM

## 2025-03-19 DIAGNOSIS — F02.80 OTHER FRONTOTEMPORAL DEMENTIA: ICD-10-CM

## 2025-03-19 DIAGNOSIS — G91.2 (IDIOPATHIC) NORMAL PRESSURE HYDROCEPHALUS: ICD-10-CM

## 2025-03-19 DIAGNOSIS — G12.20 OTHER FRONTOTEMPORAL DEMENTIA: ICD-10-CM

## 2025-03-19 DIAGNOSIS — M24.549 CONTRACTURE, UNSPECIFIED HAND: ICD-10-CM

## 2025-03-19 DIAGNOSIS — R26.9 UNSPECIFIED ABNORMALITIES OF GAIT AND MOBILITY: ICD-10-CM

## 2025-03-19 PROCEDURE — 99349 HOME/RES VST EST MOD MDM 40: CPT

## 2025-03-21 ENCOUNTER — LABORATORY RESULT (OUTPATIENT)
Age: 80
End: 2025-03-21

## 2025-05-07 ENCOUNTER — APPOINTMENT (OUTPATIENT)
Dept: GERIATRICS | Facility: HOME HEALTH | Age: 80
End: 2025-05-07
Payer: MEDICARE

## 2025-05-07 VITALS
OXYGEN SATURATION: 96 % | TEMPERATURE: 97.5 F | SYSTOLIC BLOOD PRESSURE: 110 MMHG | HEART RATE: 72 BPM | DIASTOLIC BLOOD PRESSURE: 70 MMHG

## 2025-05-07 DIAGNOSIS — G91.2 (IDIOPATHIC) NORMAL PRESSURE HYDROCEPHALUS: ICD-10-CM

## 2025-05-07 DIAGNOSIS — M80.08XG AGE-RELATED OSTEOPOROSIS WITH CURRENT PATHOLOGICAL FRACTURE, VERTEBRA(E), SUBSEQUENT ENCOUNTER FOR FRACTURE WITH DELAYED HEALING: ICD-10-CM

## 2025-05-07 DIAGNOSIS — I10 ESSENTIAL (PRIMARY) HYPERTENSION: ICD-10-CM

## 2025-05-07 DIAGNOSIS — M48.061 SPINAL STENOSIS, LUMBAR REGION WITHOUT NEUROGENIC CLAUDICATION: ICD-10-CM

## 2025-05-07 DIAGNOSIS — F41.9 ANXIETY DISORDER, UNSPECIFIED: ICD-10-CM

## 2025-05-07 PROCEDURE — 99349 HOME/RES VST EST MOD MDM 40: CPT

## 2025-07-17 ENCOUNTER — APPOINTMENT (OUTPATIENT)
Dept: GERIATRICS | Facility: HOME HEALTH | Age: 80
End: 2025-07-17
Payer: MEDICARE

## 2025-07-17 VITALS
HEART RATE: 79 BPM | SYSTOLIC BLOOD PRESSURE: 110 MMHG | OXYGEN SATURATION: 97 % | TEMPERATURE: 97.4 F | DIASTOLIC BLOOD PRESSURE: 60 MMHG

## 2025-07-17 PROCEDURE — 99349 HOME/RES VST EST MOD MDM 40: CPT

## 2025-09-05 ENCOUNTER — APPOINTMENT (OUTPATIENT)
Dept: CARDIOLOGY | Facility: CLINIC | Age: 80
End: 2025-09-05